# Patient Record
Sex: FEMALE | Race: WHITE | HISPANIC OR LATINO | Employment: FULL TIME | URBAN - METROPOLITAN AREA
[De-identification: names, ages, dates, MRNs, and addresses within clinical notes are randomized per-mention and may not be internally consistent; named-entity substitution may affect disease eponyms.]

---

## 2018-05-16 ENCOUNTER — NON-PROVIDER VISIT (OUTPATIENT)
Dept: OBGYN | Facility: CLINIC | Age: 23
End: 2018-05-16

## 2018-05-16 DIAGNOSIS — Z32.01 POSITIVE PREGNANCY TEST: ICD-10-CM

## 2018-05-16 LAB
INT CON NEG: NEGATIVE
INT CON POS: POSITIVE
POC URINE PREGNANCY TEST: POSITIVE

## 2018-05-16 PROCEDURE — 81025 URINE PREGNANCY TEST: CPT | Performed by: OBSTETRICS & GYNECOLOGY

## 2018-06-04 ENCOUNTER — INITIAL PRENATAL (OUTPATIENT)
Dept: OBGYN | Facility: CLINIC | Age: 23
End: 2018-06-04
Payer: MEDICAID

## 2018-06-04 VITALS
HEIGHT: 61 IN | BODY MASS INDEX: 35.87 KG/M2 | DIASTOLIC BLOOD PRESSURE: 96 MMHG | SYSTOLIC BLOOD PRESSURE: 138 MMHG | WEIGHT: 190 LBS

## 2018-06-04 DIAGNOSIS — O09.899 SUPERVISION OF OTHER HIGH RISK PREGNANCY, ANTEPARTUM: ICD-10-CM

## 2018-06-04 PROCEDURE — 99203 OFFICE O/P NEW LOW 30 MIN: CPT | Performed by: OBSTETRICS & GYNECOLOGY

## 2018-06-04 NOTE — PROGRESS NOTES
"Venus Jimenez,  22 y.o.  female presents today with a C/O of :oligomenorrhea. Pt   No LMP recorded (lmp unknown). Patient is pregnant.       Subjective : Nausea/Vomiting: No:  Abdominal /pelvic cramping : No :   vaginal bleeding:No      Menstrual Flow : moderate   GYN ROS:  no breast pain or new or enlarging lumps on self exam      History reviewed. No pertinent past medical history.    History reviewed. No pertinent surgical history.    Current Birth control:  none    OB History    Para Term  AB Living   1             SAB TAB Ectopic Molar Multiple Live Births                    # Outcome Date GA Lbr Raul/2nd Weight Sex Delivery Anes PTL Lv   1 Current                       Allergy:      Patient has no known allergies.    Exam;    /96   Ht 1.549 m (5' 1\")   Wt 86.2 kg (190 lb)   LMP  (LMP Unknown)   BMI 35.90 kg/m²   well-appearing, well-hydrated, well-nourished  normal;   PERRLA, EOMI, fundi grossly normal, no papilledema, no AV nicking, sclera clear  Clear to auscultation  RRR No M  abdomen is soft without significant tenderness, masses, organomegaly or guarding  External genitalia normal, Vagina normal without discharge, Urethra without abnormality or dischargeLab.    No results found for this or any previous visit (from the past 336 hour(s)).  Ultrasound : Per my Read     Transabdominal     Second/third trimester findings: BPD: 7.87 cm, Placenta localization: fundal posterior  Fetal presentation: cephalic and US RAJESH: 2018  BPD/HC/AC/FL  C/w 63l1nmc   Active FM, Cardiac       Assessment:    Late entry   32 week pregnancy       Plan:  2 weeks for NOB   Need Labs , ASAP, and schedule Level 2 scan     "

## 2018-06-04 NOTE — PROGRESS NOTES
today. unknown LMP. Irregular periods  On PNV  Phone # [phone]  Pharmacy verified with patient  + FM for 1 month  c/o vaginal itching, whitish, yellowish discharge .

## 2018-06-05 ENCOUNTER — HOSPITAL ENCOUNTER (OUTPATIENT)
Facility: MEDICAL CENTER | Age: 23
End: 2018-06-05
Attending: NURSE PRACTITIONER
Payer: COMMERCIAL

## 2018-06-05 ENCOUNTER — INITIAL PRENATAL (OUTPATIENT)
Dept: OBGYN | Facility: CLINIC | Age: 23
End: 2018-06-05

## 2018-06-05 ENCOUNTER — APPOINTMENT (OUTPATIENT)
Dept: OBGYN | Facility: CLINIC | Age: 23
End: 2018-06-05

## 2018-06-05 ENCOUNTER — ROUTINE PRENATAL (OUTPATIENT)
Dept: OBGYN | Facility: CLINIC | Age: 23
End: 2018-06-05

## 2018-06-05 VITALS
DIASTOLIC BLOOD PRESSURE: 82 MMHG | SYSTOLIC BLOOD PRESSURE: 140 MMHG | WEIGHT: 199 LBS | HEIGHT: 61 IN | BODY MASS INDEX: 37.57 KG/M2

## 2018-06-05 DIAGNOSIS — R03.0 ELEVATED BP WITHOUT DIAGNOSIS OF HYPERTENSION: Primary | ICD-10-CM

## 2018-06-05 DIAGNOSIS — R03.0 ELEVATED BP WITHOUT DIAGNOSIS OF HYPERTENSION: ICD-10-CM

## 2018-06-05 DIAGNOSIS — O09.899 SUPERVISION OF OTHER HIGH RISK PREGNANCY, ANTEPARTUM: ICD-10-CM

## 2018-06-05 LAB
APPEARANCE UR: CLEAR
BILIRUB UR STRIP-MCNC: NORMAL MG/DL
COLOR UR AUTO: YELLOW
GLUCOSE UR STRIP.AUTO-MCNC: NEGATIVE MG/DL
KETONES UR STRIP.AUTO-MCNC: NEGATIVE MG/DL
LEUKOCYTE ESTERASE UR QL STRIP.AUTO: NORMAL
NITRITE UR QL STRIP.AUTO: NEGATIVE
NST ACOUSTIC STIMULATION: NORMAL
NST ACTION NECESSARY: NORMAL
NST ASSESSMENT: NORMAL
NST BASELINE: 140
NST INDICATIONS: NORMAL
NST OTHER DATA: NORMAL
NST READ BY: NORMAL
NST RETURN: NORMAL
NST UTERINE ACTIVITY: NORMAL
PH UR STRIP.AUTO: 6 [PH] (ref 5–8)
PROT UR QL STRIP: NEGATIVE MG/DL
RBC UR QL AUTO: NEGATIVE
SP GR UR STRIP.AUTO: 1.02
UROBILINOGEN UR STRIP-MCNC: NORMAL MG/DL

## 2018-06-05 PROCEDURE — 90715 TDAP VACCINE 7 YRS/> IM: CPT | Performed by: NURSE PRACTITIONER

## 2018-06-05 PROCEDURE — 59401 PR NEW OB VISIT: CPT | Performed by: NURSE PRACTITIONER

## 2018-06-05 PROCEDURE — 59025 FETAL NON-STRESS TEST: CPT | Performed by: NURSE PRACTITIONER

## 2018-06-05 PROCEDURE — 81002 URINALYSIS NONAUTO W/O SCOPE: CPT | Performed by: NURSE PRACTITIONER

## 2018-06-05 PROCEDURE — 90471 IMMUNIZATION ADMIN: CPT | Performed by: NURSE PRACTITIONER

## 2018-06-05 NOTE — PROGRESS NOTES
NOB visit today   Ob f/u. + fetal movement good  No VB, LOF or contractions white yellowish discharge  With odor/burning/itching   C/O   Phone number # [phone]  Pharmacy verified with patient  FL=179 lbs     180 lbs before pregnancy      QY=055/82  FOB is involved  Baby was planned  Pt is not working nor lifting, around chemical  CRISTOBAL given today with instructions   BTL offered today.declines    Cystic fibrosis offered. Pt declines   Tdap vaccine given. 06/05/18 Right  Deltoid. VIS given and screening check list reviewed with pt. Verified byRomel Mccarty. MA

## 2018-06-05 NOTE — LETTER
"Count Your Baby's Movements  Another step to a healthy delivery    Venus Jimenez             Dept: [phone]    How Many Weeks Pregnant? 33w3d    Date to Begin Countin2018              How to use this chart    One way for your physician to keep track of your baby's health is by knowing how often the baby moves (or \"kicks\") in your womb.  You can help your physician to do this by using this chart every day.    Every day, you should see how many hours it takes for your baby to move 10 times.  Start in the morning, as soon as you get up.    · First, write down the time your baby moves until you get to 10.  · Check off one box every time your baby moves until you get to 10.  · Write down the time you finished counting in the last column.  · Total how long it took to count up all 10 movements.  · Finally, fill in the box that shows how long this took.  After counting 10 movements, you no longer have to count any more that day.  The next morning, just start counting again as soon as you get up.    What should you call a \"movement\"?  It is hard to say, because it will feel different from one mother to another and from one pregnancy to the next.  The important thing is that you count the movements the same way throughout your pregnancy.  If you have more questions, you should ask your physician.    Count carefully every day!  SAMPLE:  Week 28    How many hours did it take to feel 10 movements?       Start  Time     1     2     3     4     5     6     7     8     9     10   Finish Time   Mon 8:20 ·  ·  ·  ·  ·  ·  ·  ·  ·  ·  11:40                  Sat               Sun                 IMPORTANT: You should contact your physician if it takes more than two hours for you to feel 10 movements.  Each morning, write down the time and start to count the movements of your baby.  Keep track by checking off one box every time you feel one movement.  When you have " "felt 10 \"kicks\", write down the time you finished counting in the last column.  Then fill in the   box (over the check mansoor) for the number of hours it took.  Be sure to read the complete instructions on the previous page.            "

## 2018-06-05 NOTE — PROGRESS NOTES
"S:  Venus Jimenez is a 22 y.o.  who presents for her new OB exam.  She is 33w3d with and RAJESH of Estimated Date of Delivery: 18 by  done yesterday. She has complaints of abnormal yellow discharge with burning and odor.  She is currently not working outside the home. No heavy lifting or chemical exposure. No ER visits or previous care in this pregnancy. Has no support in this area. Family is [place]. FOB is here with her and works in [place]. Apparently she did not know she was pregnant.    Too late AFP.  declines CF.  Denies VB, LOF, or cramping.  Denies dysuria, vaginal DC. Reports positive fetal movement.     Pt is  and lives with FOB.  Pregnancy is desired.      Past Medical History:   Diagnosis Date   • Supervision of other high risk pregnancy, antepartum 2018     Family History   Problem Relation Age of Onset   • No Known Problems Mother    • No Known Problems Father    • No Known Problems Sister      Social History     Social History   • Marital status: Single     Spouse name: N/A   • Number of children: N/A   • Years of education: N/A     Occupational History   • Not on file.     Social History Main Topics   • Smoking status: Never Smoker   • Smokeless tobacco: Never Used   • Alcohol use No   • Drug use: No   • Sexual activity: Not Currently     Other Topics Concern   • Not on file     Social History Narrative   • No narrative on file     OB History    Para Term  AB Living   1             SAB TAB Ectopic Molar Multiple Live Births                    # Outcome Date GA Lbr Raul/2nd Weight Sex Delivery Anes PTL Lv   1 Current                   History of HSV I or II in self or partner: no  History of Thyroid problems: no    O:    Vitals:    18 0819 18 0822   BP:  140/82   Weight: 90.3 kg (199 lb) 90.3 kg (199 lb)   Height:  1.549 m (5' 1\")      See Prenatal Physical.    Wet mount: negative. Awaiting pap results.       A:   1.  IUP @ 33w3d per  late entry to " care.         2.  S=D        3.  See problem list below        4.  Abnormal discharge.          5.  Chronic vs gestational htn.      Patient Active Problem List    Diagnosis Date Noted   • Elevated BP  06/05/2018   • Supervision of other high risk pregnancy, antepartum 06/04/2018         P:  1.  GC/CT & pap done        2.  Prenatal labs ordered - lab slip given. Including labs for elevated BP        3.  Discussed PNV, diet, avoidances and adequate water intake        4.  NOB packet given        5.  Return to office in 1 wk        6.  Complete OB US first available.         7.  NST now for elevated BP.         8.  TDAP today. Given by medical assistant under supervision of physician in clinic today.     No orders of the defined types were placed in this encounter.

## 2018-06-05 NOTE — PROGRESS NOTES
"Subjective:      Venus Jimenez is a 22 y.o. female who presents with No chief complaint on file.            HPI    ROS       Objective:     /82   Ht 1.549 m (5' 1\")   Wt 90.3 kg (199 lb)   BMI 37.60 kg/m²      Physical Exam   Constitutional: She is oriented to person, place, and time. She appears well-developed and well-nourished.   HENT:   Head: Normocephalic.   Eyes: Pupils are equal, round, and reactive to light.   Neck: Normal range of motion. No thyromegaly present.   Cardiovascular: Normal rate, regular rhythm and normal heart sounds.    Pulmonary/Chest: Effort normal and breath sounds normal.   Abdominal: Soft. Bowel sounds are normal.   Genitourinary: Vagina normal and uterus normal.   Musculoskeletal: Normal range of motion.   Neurological: She is alert and oriented to person, place, and time.   Skin: Skin is warm and dry.   Psychiatric: She has a normal mood and affect. Her behavior is normal. Judgment and thought content normal.   Vitals reviewed.              Assessment/Plan:     1. Supervision of other high risk pregnancy, antepartum    - THINPREP RFLX HPV ASCUS W/CTNG; Future  - TDAP VACCINE =>8YO IM  - GLUCOSE 1HR GESTATIONAL; Future  - PREG CNTR PRENATAL PN; Future  - POCT Urinalysis  - CBC WITH DIFFERENTIAL    2. Elevated BP     - HEPATIC FUNCTION PANEL  - URIC ACID; Future  - URINETOTAL PROTEIN 24 HR; Future  - COMP METABOLIC PANEL      "

## 2018-06-06 LAB
C TRACH DNA GENITAL QL NAA+PROBE: NEGATIVE
CYTOLOGY REG CYTOL: NORMAL
N GONORRHOEA DNA GENITAL QL NAA+PROBE: NEGATIVE
SPECIMEN SOURCE: NORMAL

## 2018-06-07 ENCOUNTER — HOSPITAL ENCOUNTER (OUTPATIENT)
Dept: LAB | Facility: MEDICAL CENTER | Age: 23
End: 2018-06-07
Attending: NURSE PRACTITIONER
Payer: COMMERCIAL

## 2018-06-07 DIAGNOSIS — R03.0 ELEVATED BP WITHOUT DIAGNOSIS OF HYPERTENSION: ICD-10-CM

## 2018-06-07 DIAGNOSIS — O09.899 SUPERVISION OF OTHER HIGH RISK PREGNANCY, ANTEPARTUM: ICD-10-CM

## 2018-06-07 LAB
ABO GROUP BLD: NORMAL
ALBUMIN SERPL BCP-MCNC: 4.2 G/DL (ref 3.2–4.9)
ALBUMIN/GLOB SERPL: 1.2 G/DL
ALP SERPL-CCNC: 86 U/L (ref 30–99)
ALT SERPL-CCNC: 22 U/L (ref 2–50)
ANION GAP SERPL CALC-SCNC: 13 MMOL/L (ref 0–11.9)
APPEARANCE UR: ABNORMAL
AST SERPL-CCNC: 26 U/L (ref 12–45)
BACTERIA #/AREA URNS HPF: ABNORMAL /HPF
BASOPHILS # BLD AUTO: 0.7 % (ref 0–1.8)
BASOPHILS # BLD: 0.07 K/UL (ref 0–0.12)
BILIRUB CONJ SERPL-MCNC: 0.1 MG/DL (ref 0.1–0.5)
BILIRUB INDIRECT SERPL-MCNC: 0.3 MG/DL (ref 0–1)
BILIRUB SERPL-MCNC: 0.4 MG/DL (ref 0.1–1.5)
BILIRUB UR QL STRIP.AUTO: NEGATIVE
BLD GP AB SCN SERPL QL: NORMAL
BUN SERPL-MCNC: 9 MG/DL (ref 8–22)
CALCIUM SERPL-MCNC: 9.4 MG/DL (ref 8.5–10.5)
CHLORIDE SERPL-SCNC: 105 MMOL/L (ref 96–112)
CO2 SERPL-SCNC: 19 MMOL/L (ref 20–33)
COLOR UR: YELLOW
CREAT SERPL-MCNC: 0.37 MG/DL (ref 0.5–1.4)
EOSINOPHIL # BLD AUTO: 0.03 K/UL (ref 0–0.51)
EOSINOPHIL NFR BLD: 0.3 % (ref 0–6.9)
EPI CELLS #/AREA URNS HPF: ABNORMAL /HPF
ERYTHROCYTE [DISTWIDTH] IN BLOOD BY AUTOMATED COUNT: 32.8 FL (ref 35.9–50)
GLOBULIN SER CALC-MCNC: 3.5 G/DL (ref 1.9–3.5)
GLUCOSE 1H P 50 G GLC PO SERPL-MCNC: 98 MG/DL (ref 70–139)
GLUCOSE SERPL-MCNC: 75 MG/DL (ref 65–99)
GLUCOSE UR STRIP.AUTO-MCNC: NEGATIVE MG/DL
HBV SURFACE AG SER QL: NEGATIVE
HCT VFR BLD AUTO: 36.1 % (ref 37–47)
HGB BLD-MCNC: 11.2 G/DL (ref 12–16)
HIV 1+2 AB+HIV1 P24 AG SERPL QL IA: NON REACTIVE
IMM GRANULOCYTES # BLD AUTO: 0.07 K/UL (ref 0–0.11)
IMM GRANULOCYTES NFR BLD AUTO: 0.7 % (ref 0–0.9)
KETONES UR STRIP.AUTO-MCNC: >=160 MG/DL
LEUKOCYTE ESTERASE UR QL STRIP.AUTO: ABNORMAL
LYMPHOCYTES # BLD AUTO: 2.53 K/UL (ref 1–4.8)
LYMPHOCYTES NFR BLD: 23.5 % (ref 22–41)
MCH RBC QN AUTO: 19 PG (ref 27–33)
MCHC RBC AUTO-ENTMCNC: 31 G/DL (ref 33.6–35)
MCV RBC AUTO: 61.3 FL (ref 81.4–97.8)
MICRO URNS: ABNORMAL
MONOCYTES # BLD AUTO: 0.64 K/UL (ref 0–0.85)
MONOCYTES NFR BLD AUTO: 5.9 % (ref 0–13.4)
NEUTROPHILS # BLD AUTO: 7.42 K/UL (ref 2–7.15)
NEUTROPHILS NFR BLD: 68.9 % (ref 44–72)
NITRITE UR QL STRIP.AUTO: NEGATIVE
NRBC # BLD AUTO: 0 K/UL
NRBC BLD-RTO: 0 /100 WBC
PH UR STRIP.AUTO: 5.5 [PH]
PLATELET # BLD AUTO: 414 K/UL (ref 164–446)
PMV BLD AUTO: 11 FL (ref 9–12.9)
POTASSIUM SERPL-SCNC: 3.5 MMOL/L (ref 3.6–5.5)
PROT 24H UR-MCNC: 184.7 MG/24 HR (ref 30–150)
PROT 24H UR-MRATE: 8.3 MG/DL (ref 0–15)
PROT SERPL-MCNC: 7.7 G/DL (ref 6–8.2)
PROT UR QL STRIP: NEGATIVE MG/DL
RBC # BLD AUTO: 5.89 M/UL (ref 4.2–5.4)
RBC # URNS HPF: ABNORMAL /HPF
RBC UR QL AUTO: NEGATIVE
RH BLD: NORMAL
RUBV AB SER QL: 11.8 IU/ML
SODIUM SERPL-SCNC: 137 MMOL/L (ref 135–145)
SP GR UR STRIP.AUTO: 1.02
SPECIMEN VOL UR: 2225 ML
TREPONEMA PALLIDUM IGG+IGM AB [PRESENCE] IN SERUM OR PLASMA BY IMMUNOASSAY: NON REACTIVE
URATE SERPL-MCNC: 5.3 MG/DL (ref 1.9–8.2)
UROBILINOGEN UR STRIP.AUTO-MCNC: 1 MG/DL
WBC # BLD AUTO: 10.8 K/UL (ref 4.8–10.8)
WBC #/AREA URNS HPF: ABNORMAL /HPF

## 2018-06-09 LAB
BACTERIA UR CULT: NORMAL
SIGNIFICANT IND 70042: NORMAL
SITE SITE: NORMAL
SOURCE SOURCE: NORMAL

## 2018-06-12 ENCOUNTER — ROUTINE PRENATAL (OUTPATIENT)
Dept: OBGYN | Facility: CLINIC | Age: 23
End: 2018-06-12

## 2018-06-12 VITALS — DIASTOLIC BLOOD PRESSURE: 80 MMHG | SYSTOLIC BLOOD PRESSURE: 142 MMHG | WEIGHT: 189 LBS | BODY MASS INDEX: 35.71 KG/M2

## 2018-06-12 DIAGNOSIS — O09.899 SUPERVISION OF OTHER HIGH RISK PREGNANCY, ANTEPARTUM: ICD-10-CM

## 2018-06-12 PROCEDURE — 90040 PR PRENATAL FOLLOW UP: CPT | Performed by: NURSE PRACTITIONER

## 2018-06-12 NOTE — PROGRESS NOTES
OB f/u. + fetal movement.  No VB, LOF or UC's.  Good phone # [phone]  Preferred pharmacy confirmed  Pt c/o yellow discharge with itching and odor   Pt denies any HA, vision changes and swelling

## 2018-06-12 NOTE — PROGRESS NOTES
SUBJECTIVE:  Pt is a 22 y.o.   at 34w3d  gestation. Presents today for follow-up prenatal care. Reports no issues at this time.  Reports good fetal movement. Denies cramping/contractions, bleeding or leaking of fluid. Denies dysuria, headaches, N/V, or other issues at this time. Generally feels well today. Reports itchy on entrance to vagina and on labia for several months, yellowish/white d/c that smells like feet. Pt reports eating well, no N/V.     OBJECTIVE:  - See prenatal vitals flow  -   Vitals:    18 0843   BP: 146/84   Weight: 85.7 kg (189 lb)                 ASSESSMENT:   - IUP at 34w3d    - S=D   - Positive budding yeast, negative clue cells, neg trich  Patient Active Problem List    Diagnosis Date Noted   • Elevated BP  2018   • Supervision of other high risk pregnancy, antepartum 2018         PLAN:  - Monistat 7 advised   - US scheduled for   - Preeclampsia warning s/sx reviewed and where to go  - S/sx pregnancy and labor warning signs vs general discomforts discussed  - Fetal movements and kick counts reviewed   - Adequate hydration reinforced  - Nutrition/exercise/vitamin education; continued PNV  - Encouraged tour of LnD/childbirth education classes: contact info provided   - S/p TDAP vacc  - Anticipatory guidance given  - RTC in 1 weeks for follow-up prenatal care

## 2018-06-19 ENCOUNTER — APPOINTMENT (OUTPATIENT)
Dept: RADIOLOGY | Facility: IMAGING CENTER | Age: 23
End: 2018-06-19
Attending: NURSE PRACTITIONER
Payer: MEDICAID

## 2018-06-19 DIAGNOSIS — O09.899 SUPERVISION OF OTHER HIGH RISK PREGNANCY, ANTEPARTUM: ICD-10-CM

## 2018-06-19 PROCEDURE — 76805 OB US >/= 14 WKS SNGL FETUS: CPT | Performed by: OBSTETRICS & GYNECOLOGY

## 2018-06-21 ENCOUNTER — DATING (OUTPATIENT)
Dept: OBGYN | Facility: CLINIC | Age: 23
End: 2018-06-21

## 2018-06-21 DIAGNOSIS — O28.3 ABNORMAL FETAL ULTRASOUND: ICD-10-CM

## 2018-06-22 ENCOUNTER — ROUTINE PRENATAL (OUTPATIENT)
Dept: OBGYN | Facility: CLINIC | Age: 23
End: 2018-06-22
Payer: MEDICAID

## 2018-06-22 ENCOUNTER — HOSPITAL ENCOUNTER (OUTPATIENT)
Dept: LAB | Facility: MEDICAL CENTER | Age: 23
End: 2018-06-22
Attending: OBSTETRICS & GYNECOLOGY
Payer: MEDICAID

## 2018-06-22 VITALS — WEIGHT: 192 LBS | DIASTOLIC BLOOD PRESSURE: 92 MMHG | SYSTOLIC BLOOD PRESSURE: 144 MMHG | BODY MASS INDEX: 36.28 KG/M2

## 2018-06-22 DIAGNOSIS — O28.3 ABNORMAL FETAL ULTRASOUND: ICD-10-CM

## 2018-06-22 DIAGNOSIS — R03.0 ELEVATED BP WITHOUT DIAGNOSIS OF HYPERTENSION: ICD-10-CM

## 2018-06-22 DIAGNOSIS — O09.899 SUPERVISION OF OTHER HIGH RISK PREGNANCY, ANTEPARTUM: ICD-10-CM

## 2018-06-22 LAB
AST SERPL-CCNC: 19 U/L (ref 12–45)
BUN SERPL-MCNC: 7 MG/DL (ref 8–22)
CREAT SERPL-MCNC: 0.43 MG/DL (ref 0.5–1.4)
ERYTHROCYTE [DISTWIDTH] IN BLOOD BY AUTOMATED COUNT: 33.4 FL (ref 35.9–50)
HCT VFR BLD AUTO: 37.6 % (ref 37–47)
HGB BLD-MCNC: 11.4 G/DL (ref 12–16)
MCH RBC QN AUTO: 18.8 PG (ref 27–33)
MCHC RBC AUTO-ENTMCNC: 30.3 G/DL (ref 33.6–35)
MCV RBC AUTO: 61.8 FL (ref 81.4–97.8)
PLATELET # BLD AUTO: 379 K/UL (ref 164–446)
RBC # BLD AUTO: 6.08 M/UL (ref 4.2–5.4)
URATE SERPL-MCNC: 3.8 MG/DL (ref 1.9–8.2)
WBC # BLD AUTO: 10.7 K/UL (ref 4.8–10.8)

## 2018-06-22 PROCEDURE — 84450 TRANSFERASE (AST) (SGOT): CPT

## 2018-06-22 PROCEDURE — 82565 ASSAY OF CREATININE: CPT

## 2018-06-22 PROCEDURE — 84550 ASSAY OF BLOOD/URIC ACID: CPT

## 2018-06-22 PROCEDURE — 36415 COLL VENOUS BLD VENIPUNCTURE: CPT

## 2018-06-22 PROCEDURE — 90040 PR PRENATAL FOLLOW UP: CPT | Performed by: OBSTETRICS & GYNECOLOGY

## 2018-06-22 PROCEDURE — 84520 ASSAY OF UREA NITROGEN: CPT

## 2018-06-22 PROCEDURE — 85027 COMPLETE CBC AUTOMATED: CPT

## 2018-06-22 NOTE — PROGRESS NOTES
OB Follow Up--- High Risk  Fetal Cardiac Defect: ASD     Elevated BP       23 y.o. is a 34w6d presents in prenatal follow up.    Subjective:no complaints, concerned over U/S . Fetal Movement  positive    Problem List:has Supervision of other high risk pregnancy, antepartum; Elevated BP ; and Abnormal fetal ultrasound on her problem list.     Objective:   /92   Wt 87.1 kg (192 lb)   BMI 36.28 kg/m²   Repeat BP : 138/90    Abdomen:  S=D  Extremities:Normal        Lab:No results found for this or any previous visit (from the past 336 hour(s)).      Assessment:    34w6d      High Risk  Fetal Cardiac Defect: ASD     Elevated BP     Appears Gestational : Need repeat Labs   Early pregnancy warning signs (bleeding,pain,discharge,leaking) discussed.    Plan:  1 week  Referral to [facility]  Repeat labs   Continue water intake  Ambulate nightly after 37 weeks  Continue Kick counts

## 2018-06-24 ENCOUNTER — HOSPITAL ENCOUNTER (OUTPATIENT)
Facility: MEDICAL CENTER | Age: 23
End: 2018-06-24
Attending: OBSTETRICS & GYNECOLOGY
Payer: MEDICAID

## 2018-06-25 ENCOUNTER — HOSPITAL ENCOUNTER (OUTPATIENT)
Facility: MEDICAL CENTER | Age: 23
End: 2018-06-25
Attending: OBSTETRICS & GYNECOLOGY
Payer: MEDICAID

## 2018-06-25 PROCEDURE — 84156 ASSAY OF PROTEIN URINE: CPT

## 2018-06-25 PROCEDURE — 81050 URINALYSIS VOLUME MEASURE: CPT

## 2018-06-25 NOTE — PROGRESS NOTES
Referral faxed to Dr. HERNANDEZ  on 6/25/18.  They will contact patient to schedule appt.  Please check with patient if appt was made/ document. Thank you

## 2018-06-27 ENCOUNTER — TELEPHONE (OUTPATIENT)
Dept: OBGYN | Facility: CLINIC | Age: 23
End: 2018-06-27

## 2018-06-27 LAB
PROT 24H UR-MCNC: 156 MG/24 HR (ref 30–150)
PROT 24H UR-MRATE: 5.2 MG/DL (ref 0–15)
SPECIMEN VOL UR: 3000 ML

## 2018-06-27 NOTE — TELEPHONE ENCOUNTER
Pt called re: MFM referral, they haven't call her.  I called DR. Pichardo's office and spoke w/Rosy to check on referral status, they will call her today, per Rosy.  Pt notified and instructed to call tomorrow if they haven't call her today

## 2018-06-28 ENCOUNTER — TELEPHONE (OUTPATIENT)
Dept: OBGYN | Facility: CLINIC | Age: 23
End: 2018-06-28

## 2018-06-28 NOTE — TELEPHONE ENCOUNTER
Nicolette from Dr. Pichardo's called re:referral for ASD, states per Dr. Pichardo's he is no going to see pt due to GA (35 weeks +) because there is nothing that he can do.  She needs to be referral for  echo.

## 2018-06-29 ENCOUNTER — ROUTINE PRENATAL (OUTPATIENT)
Dept: OBGYN | Facility: CLINIC | Age: 23
End: 2018-06-29
Payer: MEDICAID

## 2018-06-29 VITALS — SYSTOLIC BLOOD PRESSURE: 146 MMHG | BODY MASS INDEX: 36.47 KG/M2 | DIASTOLIC BLOOD PRESSURE: 86 MMHG | WEIGHT: 193 LBS

## 2018-06-29 DIAGNOSIS — O09.899 SUPERVISION OF OTHER HIGH RISK PREGNANCY, ANTEPARTUM: Primary | ICD-10-CM

## 2018-06-29 DIAGNOSIS — Z3A.35 35 WEEKS GESTATION OF PREGNANCY: ICD-10-CM

## 2018-06-29 DIAGNOSIS — O28.3 ABNORMAL FETAL ULTRASOUND: ICD-10-CM

## 2018-06-29 PROCEDURE — 90040 PR PRENATAL FOLLOW UP: CPT | Performed by: NURSE PRACTITIONER

## 2018-06-29 NOTE — PROGRESS NOTES
Pt. here for Ob f/u and GBS today. Good # [phone]  Good FM  Pt states still using monistat 7 for yeast infection not sure if its ok to do GBS today.   Pharmacy verified.   Unable to see Dr. Pichardo due to GA, per Dr. Pichardo, needs to be referred to cardiology after delivery, per FYI.

## 2018-06-29 NOTE — PROGRESS NOTES
S) Pt is a 23 y.o.   at 35w6d  gestation. Routine prenatal care today. No complaints today. Is currently on Monistat for yeast, will do GBS next week. Labor precautions reviewed. US ordered to recheck heart, growth, RADHA, and s/d ratios due to CHTN. All questions answered.    Fetal movement Normal  Cramping no  VB no  LOF no   Denies dysuria. Generally feels well today. Good self-care activities identified. Denies headaches, swelling, visual changes, or epigastric pain .     O) Blood pressure 146/86, weight 87.5 kg (193 lb).        Labs:       PNL: WNL       GCT: 98       AFP: Not Examined       GBS: Next visit       Pertinent ultrasound -        18- Survey with increased placental thickness, EIF noted, and ASD with aneurysmal foramen ovale. Remainder WNL, RADHA 18.28cm, c/w prev dating. Refer to Dr Pichardo, but unable to see due to advanced gestational age. Will reorder limited US today     A) IUP at 35w6d       S=D         Patient Active Problem List    Diagnosis Date Noted   • Abnormal fetal ultrasound 2018   • Elevated BP  2018   • Supervision of other high risk pregnancy, antepartum 2018          SVE: deferred         TDAP: yes       FLU: no        BTL: no       : n/a       C/S Consent: n/a       IOL or C/S scheduled: no       LAST PAP: 18- Negative         P) s/s ptl vs general discomforts. Fetal movements reviewed. General ed and anticipatory guidance. Nutrition/exercise/vitamin. Plans breast Plans pp contraception- unsure  Continue PNV.

## 2018-07-06 ENCOUNTER — HOSPITAL ENCOUNTER (OUTPATIENT)
Dept: LAB | Facility: MEDICAL CENTER | Age: 23
End: 2018-07-06
Attending: OBSTETRICS & GYNECOLOGY
Payer: MEDICAID

## 2018-07-06 ENCOUNTER — HOSPITAL ENCOUNTER (OUTPATIENT)
Facility: MEDICAL CENTER | Age: 23
End: 2018-07-06
Attending: OBSTETRICS & GYNECOLOGY
Payer: MEDICAID

## 2018-07-06 ENCOUNTER — ROUTINE PRENATAL (OUTPATIENT)
Dept: OBGYN | Facility: CLINIC | Age: 23
End: 2018-07-06

## 2018-07-06 ENCOUNTER — ROUTINE PRENATAL (OUTPATIENT)
Dept: OBGYN | Facility: CLINIC | Age: 23
End: 2018-07-06
Payer: MEDICAID

## 2018-07-06 VITALS — DIASTOLIC BLOOD PRESSURE: 67 MMHG | WEIGHT: 197 LBS | SYSTOLIC BLOOD PRESSURE: 123 MMHG | BODY MASS INDEX: 37.22 KG/M2

## 2018-07-06 DIAGNOSIS — O09.899 SUPERVISION OF OTHER HIGH RISK PREGNANCY, ANTEPARTUM: ICD-10-CM

## 2018-07-06 DIAGNOSIS — O16.3 ELEVATED BLOOD PRESSURE AFFECTING PREGNANCY IN THIRD TRIMESTER, ANTEPARTUM: ICD-10-CM

## 2018-07-06 DIAGNOSIS — O13.3 PREGNANCY-INDUCED HYPERTENSION IN THIRD TRIMESTER: ICD-10-CM

## 2018-07-06 DIAGNOSIS — R03.0 ELEVATED BP WITHOUT DIAGNOSIS OF HYPERTENSION: ICD-10-CM

## 2018-07-06 DIAGNOSIS — O28.3 ABNORMAL FETAL ULTRASOUND: ICD-10-CM

## 2018-07-06 LAB
APPEARANCE UR: NORMAL
AST SERPL-CCNC: 17 U/L (ref 12–45)
BILIRUB UR STRIP-MCNC: NORMAL MG/DL
BUN SERPL-MCNC: 6 MG/DL (ref 8–22)
COLOR UR AUTO: NORMAL
CREAT SERPL-MCNC: 0.39 MG/DL (ref 0.5–1.4)
ERYTHROCYTE [DISTWIDTH] IN BLOOD BY AUTOMATED COUNT: 33.2 FL (ref 35.9–50)
GLUCOSE UR STRIP.AUTO-MCNC: NORMAL MG/DL
HCT VFR BLD AUTO: 35.7 % (ref 37–47)
HGB BLD-MCNC: 10.9 G/DL (ref 12–16)
KETONES UR STRIP.AUTO-MCNC: NORMAL MG/DL
LEUKOCYTE ESTERASE UR QL STRIP.AUTO: NORMAL
MCH RBC QN AUTO: 18.6 PG (ref 27–33)
MCHC RBC AUTO-ENTMCNC: 30.5 G/DL (ref 33.6–35)
MCV RBC AUTO: 60.9 FL (ref 81.4–97.8)
NITRITE UR QL STRIP.AUTO: NORMAL
NST ACOUSTIC STIMULATION: NORMAL
NST ACTION NECESSARY: NORMAL
NST ASSESSMENT: REACTIVE
NST BASELINE: 140
NST INDICATIONS: NORMAL
NST OTHER DATA: NORMAL
NST READ BY: NORMAL
NST RETURN: NORMAL
NST UTERINE ACTIVITY: NORMAL
PH UR STRIP.AUTO: 6.5 [PH] (ref 5–8)
PLATELET # BLD AUTO: 357 K/UL (ref 164–446)
PMV BLD AUTO: 11.3 FL (ref 9–12.9)
PROT UR QL STRIP: NORMAL MG/DL
RBC # BLD AUTO: 5.86 M/UL (ref 4.2–5.4)
RBC UR QL AUTO: NORMAL
SP GR UR STRIP.AUTO: 1.01
URATE SERPL-MCNC: 3.7 MG/DL (ref 1.9–8.2)
UROBILINOGEN UR STRIP-MCNC: NORMAL MG/DL
WBC # BLD AUTO: 10.8 K/UL (ref 4.8–10.8)

## 2018-07-06 PROCEDURE — 36415 COLL VENOUS BLD VENIPUNCTURE: CPT

## 2018-07-06 PROCEDURE — 84550 ASSAY OF BLOOD/URIC ACID: CPT

## 2018-07-06 PROCEDURE — 59025 FETAL NON-STRESS TEST: CPT | Performed by: OBSTETRICS & GYNECOLOGY

## 2018-07-06 PROCEDURE — 82565 ASSAY OF CREATININE: CPT

## 2018-07-06 PROCEDURE — 84450 TRANSFERASE (AST) (SGOT): CPT

## 2018-07-06 PROCEDURE — 87653 STREP B DNA AMP PROBE: CPT

## 2018-07-06 PROCEDURE — 85027 COMPLETE CBC AUTOMATED: CPT

## 2018-07-06 PROCEDURE — 90040 PR PRENATAL FOLLOW UP: CPT | Performed by: OBSTETRICS & GYNECOLOGY

## 2018-07-06 PROCEDURE — 84520 ASSAY OF UREA NITROGEN: CPT

## 2018-07-06 PROCEDURE — 81002 URINALYSIS NONAUTO W/O SCOPE: CPT | Performed by: OBSTETRICS & GYNECOLOGY

## 2018-07-06 NOTE — PROGRESS NOTES
Pt here today for OB follow up  Pt states no complaints   Reports +FM  Good # [phone]  Pharmacy Confirmed.

## 2018-07-06 NOTE — PROGRESS NOTES
NST  - reactive     BP - 144/87--> 137/72 --> 123/67    6/25/18=   24 hour TP - 156  PIH - wnl    Will repeat PIH labs  Preeclamptic precautions

## 2018-07-06 NOTE — PROGRESS NOTES
Patient is at 36w6d. Doing well. No headaches no visual changes no abdominal/RUQ/epigastric pain  Good FM, no contractions, no LOF< no VB  Patients' weight gain, fluid intake and exercise level discussed.Vitals, fundal height , fetal position, and FHR reviewed on flowsheet.    .../67   Wt 89.4 kg (197 lb)   BMI 37.22 kg/m²   Past Medical History:   Diagnosis Date   • Supervision of other high risk pregnancy, antepartum 2018     Patient Active Problem List    Diagnosis Date Noted   • Abnormal fetal ultrasound 2018   • Elevated BP  2018   • Supervision of other high risk pregnancy, antepartum 2018     Lab:  Recent Results (from the past 336 hour(s))   URINETOTAL PROTEIN 24 HR    Collection Time: 18  6:15 AM   Result Value Ref Range    Total Protein, Urine 5.2 0.0 - 15.0 mg/dL    Total Volume, Urine 3000 mL    Total Protein, 24 Hour Urine 156.0 (H) 30.0 - 150.0 mg/24 Hr   POCT Fetal Nonstress Test    Collection Time: 18  8:24 AM   Result Value Ref Range    NST Indications elevated BP     NST Baseline 140     NST Uterine Activity      NST Acoustic Stimulation vas     NST Assessment reactive     NST Action Necessary      NST Other Data      NST Return      NST Read By KANDIS      Negative protein - urine dip    Assessment: 23 year old   1  36w6d  2. Doing well  3. Size equals Dates and/or Scan  4. Weight gain: normal: Yes, excessive:No                   Plan:  1. BP monitoring done - 144/87 --> 123/67  2. Repeat PIH labs  3. Preeclamptic precautions  4. Gestational HTN -  IOL at 37-38 6/7 weeks

## 2018-07-08 PROBLEM — B95.1 GROUP BETA STREP POSITIVE: Status: ACTIVE | Noted: 2018-07-08

## 2018-07-08 LAB — GP B STREP DNA SPEC QL NAA+PROBE: POSITIVE

## 2018-07-09 ENCOUNTER — TELEPHONE (OUTPATIENT)
Dept: OBGYN | Facility: CLINIC | Age: 23
End: 2018-07-09

## 2018-07-09 NOTE — TELEPHONE ENCOUNTER
Pt notified to start iron supplements twice a day. Pt verbalized understanding and had no further questions. Next OB F/U on 7/12/18

## 2018-07-09 NOTE — TELEPHONE ENCOUNTER
----- Message from KRYS Sierra sent at 7/6/2018  2:19 PM PDT -----  Pt should start iron two times a day hour before two hours after food. Thanks!

## 2018-07-12 ENCOUNTER — ROUTINE PRENATAL (OUTPATIENT)
Dept: OBGYN | Facility: CLINIC | Age: 23
End: 2018-07-12
Payer: MEDICAID

## 2018-07-12 VITALS — SYSTOLIC BLOOD PRESSURE: 120 MMHG | BODY MASS INDEX: 36.84 KG/M2 | WEIGHT: 195 LBS | DIASTOLIC BLOOD PRESSURE: 68 MMHG

## 2018-07-12 DIAGNOSIS — O09.899 SUPERVISION OF OTHER HIGH RISK PREGNANCY, ANTEPARTUM: ICD-10-CM

## 2018-07-12 DIAGNOSIS — O28.3 ABNORMAL FETAL ULTRASOUND: ICD-10-CM

## 2018-07-12 DIAGNOSIS — B95.1 GROUP BETA STREP POSITIVE: ICD-10-CM

## 2018-07-12 PROCEDURE — 90040 PR PRENATAL FOLLOW UP: CPT | Performed by: PHYSICIAN ASSISTANT

## 2018-07-12 NOTE — PROGRESS NOTES
OB f/u. + fetal movement.  No VB, LOF or UC's.  Good phone # [phone]  GBS positive   Pt is scheduled for IOL on 07/20/2018 @ 8:00 am  Pt has been notified of time and date of IOL. - pt given information and instructions   Pt c/o white discharge, denies any itching or burning    Pt has follow-up US on 7-16-18 to check growth, RADHA and s/d ratio

## 2018-07-12 NOTE — PROGRESS NOTES
Pt has no complaints with cramping, UCs, Vb, LOF. +FM. Pt has IOL 7/20 - info given today. Pt has f/u Monday for US to recheck growth, RADHA, S;D ratio. Vtx position confirmed today by US. Cervix: Cl/th/floating. Labor precautions reviewed. IOL d/w pt in detail. RTC 1 wk or sooner prn.

## 2018-07-16 ENCOUNTER — DATING (OUTPATIENT)
Dept: OBGYN | Facility: CLINIC | Age: 23
End: 2018-07-16

## 2018-07-16 ENCOUNTER — APPOINTMENT (OUTPATIENT)
Dept: RADIOLOGY | Facility: IMAGING CENTER | Age: 23
End: 2018-07-16
Attending: NURSE PRACTITIONER
Payer: MEDICAID

## 2018-07-16 DIAGNOSIS — Z3A.35 35 WEEKS GESTATION OF PREGNANCY: ICD-10-CM

## 2018-07-16 PROCEDURE — 76816 OB US FOLLOW-UP PER FETUS: CPT | Mod: 26 | Performed by: OBSTETRICS & GYNECOLOGY

## 2018-07-19 ENCOUNTER — ROUTINE PRENATAL (OUTPATIENT)
Dept: OBGYN | Facility: CLINIC | Age: 23
End: 2018-07-19
Payer: MEDICAID

## 2018-07-19 VITALS — SYSTOLIC BLOOD PRESSURE: 128 MMHG | WEIGHT: 198 LBS | DIASTOLIC BLOOD PRESSURE: 80 MMHG | BODY MASS INDEX: 37.41 KG/M2

## 2018-07-19 DIAGNOSIS — O09.899 SUPERVISION OF OTHER HIGH RISK PREGNANCY, ANTEPARTUM: ICD-10-CM

## 2018-07-19 DIAGNOSIS — O28.3 ABNORMAL FETAL ULTRASOUND: ICD-10-CM

## 2018-07-19 DIAGNOSIS — B95.1 GROUP BETA STREP POSITIVE: ICD-10-CM

## 2018-07-19 PROCEDURE — 90040 PR PRENATAL FOLLOW UP: CPT | Performed by: NURSE PRACTITIONER

## 2018-07-19 NOTE — PROGRESS NOTES
Pt here today for OB follow up  Pt states no complaints  Reports +FM  Good # [phone]  Pharmacy Confirmed.  IOL 7/20 pt notified

## 2018-07-19 NOTE — PROGRESS NOTES
SUBJECTIVE:  Pt is a 23 y.o.   at 38w5d  gestation. Presents today for follow-up prenatal care. Reports no issues at this time.  Reports good fetal movement. Denies cramping/contractions, bleeding or leaking of fluid. Denies dysuria, headaches, N/V, or other issues at this time. Generally feels well today.     OBJECTIVE:  - See prenatal vitals flow  -   Vitals:    18 0921   BP: 128/80   Weight: 89.8 kg (198 lb)                 ASSESSMENT:   - IUP at 38w5d    - S=D   - Vertex by BSUS   Patient Active Problem List    Diagnosis Date Noted   • Group beta Strep positive 2018   • Abnormal fetal ultrasound 2018   • Elevated BP  2018   • Supervision of other high risk pregnancy, antepartum 2018         PLAN:  - S/sx pregnancy and labor warning signs vs general discomforts discussed  - Fetal movements and kick counts reviewed   - Adequate hydration reinforced  - Nutrition/exercise/vitamin education; continued PNV  - Plans for breastfeeding:handout given and reviewed   - Plans pills for contraception Pp: handout given and reviewed  - S/p TDAP vacc  - Anticipatory guidance given  - RTC PRN; IOL tomorrow

## 2018-07-20 ENCOUNTER — HOSPITAL ENCOUNTER (INPATIENT)
Facility: MEDICAL CENTER | Age: 23
LOS: 3 days | End: 2018-07-23
Attending: OBSTETRICS & GYNECOLOGY | Admitting: OBSTETRICS & GYNECOLOGY
Payer: MEDICAID

## 2018-07-20 DIAGNOSIS — O09.899 SUPERVISION OF OTHER HIGH RISK PREGNANCY, ANTEPARTUM: ICD-10-CM

## 2018-07-20 LAB
ALBUMIN SERPL BCP-MCNC: 3.9 G/DL (ref 3.2–4.9)
ALBUMIN/GLOB SERPL: 1.2 G/DL
ALP SERPL-CCNC: 102 U/L (ref 30–99)
ALT SERPL-CCNC: 16 U/L (ref 2–50)
ANION GAP SERPL CALC-SCNC: 10 MMOL/L (ref 0–11.9)
AST SERPL-CCNC: 19 U/L (ref 12–45)
BASOPHILS # BLD AUTO: 0.5 % (ref 0–1.8)
BASOPHILS # BLD: 0.04 K/UL (ref 0–0.12)
BILIRUB SERPL-MCNC: 0.5 MG/DL (ref 0.1–1.5)
BUN SERPL-MCNC: 7 MG/DL (ref 8–22)
CALCIUM SERPL-MCNC: 9.3 MG/DL (ref 8.5–10.5)
CHLORIDE SERPL-SCNC: 108 MMOL/L (ref 96–112)
CO2 SERPL-SCNC: 21 MMOL/L (ref 20–33)
CREAT SERPL-MCNC: 0.42 MG/DL (ref 0.5–1.4)
EOSINOPHIL # BLD AUTO: 0.04 K/UL (ref 0–0.51)
EOSINOPHIL NFR BLD: 0.5 % (ref 0–6.9)
ERYTHROCYTE [DISTWIDTH] IN BLOOD BY AUTOMATED COUNT: 34 FL (ref 35.9–50)
GLOBULIN SER CALC-MCNC: 3.3 G/DL (ref 1.9–3.5)
GLUCOSE SERPL-MCNC: 83 MG/DL (ref 65–99)
HCT VFR BLD AUTO: 36.8 % (ref 37–47)
HGB BLD-MCNC: 11.4 G/DL (ref 12–16)
HOLDING TUBE BB 8507: NORMAL
IMM GRANULOCYTES # BLD AUTO: 0.06 K/UL (ref 0–0.11)
IMM GRANULOCYTES NFR BLD AUTO: 0.7 % (ref 0–0.9)
LYMPHOCYTES # BLD AUTO: 2.19 K/UL (ref 1–4.8)
LYMPHOCYTES NFR BLD: 24.7 % (ref 22–41)
MCH RBC QN AUTO: 18.8 PG (ref 27–33)
MCHC RBC AUTO-ENTMCNC: 31 G/DL (ref 33.6–35)
MCV RBC AUTO: 60.7 FL (ref 81.4–97.8)
MONOCYTES # BLD AUTO: 0.58 K/UL (ref 0–0.85)
MONOCYTES NFR BLD AUTO: 6.5 % (ref 0–13.4)
NEUTROPHILS # BLD AUTO: 5.96 K/UL (ref 2–7.15)
NEUTROPHILS NFR BLD: 67.1 % (ref 44–72)
NRBC # BLD AUTO: 0 K/UL
NRBC BLD-RTO: 0 /100 WBC
PLATELET # BLD AUTO: 333 K/UL (ref 164–446)
PMV BLD AUTO: 10.5 FL (ref 9–12.9)
POTASSIUM SERPL-SCNC: 3.6 MMOL/L (ref 3.6–5.5)
PROT SERPL-MCNC: 7.2 G/DL (ref 6–8.2)
RBC # BLD AUTO: 6.06 M/UL (ref 4.2–5.4)
SODIUM SERPL-SCNC: 139 MMOL/L (ref 135–145)
URATE SERPL-MCNC: 4.6 MG/DL (ref 1.9–8.2)
WBC # BLD AUTO: 8.9 K/UL (ref 4.8–10.8)

## 2018-07-20 PROCEDURE — 700101 HCHG RX REV CODE 250: Performed by: NURSE PRACTITIONER

## 2018-07-20 PROCEDURE — 84550 ASSAY OF BLOOD/URIC ACID: CPT

## 2018-07-20 PROCEDURE — 700101 HCHG RX REV CODE 250: Performed by: STUDENT IN AN ORGANIZED HEALTH CARE EDUCATION/TRAINING PROGRAM

## 2018-07-20 PROCEDURE — 85025 COMPLETE CBC W/AUTO DIFF WBC: CPT

## 2018-07-20 PROCEDURE — 80053 COMPREHEN METABOLIC PANEL: CPT

## 2018-07-20 PROCEDURE — 770002 HCHG ROOM/CARE - OB PRIVATE (112)

## 2018-07-20 PROCEDURE — 3E0P7VZ INTRODUCTION OF HORMONE INTO FEMALE REPRODUCTIVE, VIA NATURAL OR ARTIFICIAL OPENING: ICD-10-PCS | Performed by: OBSTETRICS & GYNECOLOGY

## 2018-07-20 RX ORDER — ALUMINA, MAGNESIA, AND SIMETHICONE 2400; 2400; 240 MG/30ML; MG/30ML; MG/30ML
30 SUSPENSION ORAL EVERY 6 HOURS PRN
Status: DISCONTINUED | OUTPATIENT
Start: 2018-07-20 | End: 2018-07-22 | Stop reason: HOSPADM

## 2018-07-20 RX ORDER — CLINDAMYCIN PHOSPHATE 900 MG/50ML
900 INJECTION, SOLUTION INTRAVENOUS EVERY 8 HOURS
Status: DISCONTINUED | OUTPATIENT
Start: 2018-07-20 | End: 2018-07-22

## 2018-07-20 RX ORDER — SODIUM CHLORIDE, SODIUM LACTATE, POTASSIUM CHLORIDE, CALCIUM CHLORIDE 600; 310; 30; 20 MG/100ML; MG/100ML; MG/100ML; MG/100ML
INJECTION, SOLUTION INTRAVENOUS
Status: ACTIVE
Start: 2018-07-20 | End: 2018-07-20

## 2018-07-20 RX ORDER — MISOPROSTOL 200 UG/1
800 TABLET ORAL
Status: COMPLETED | OUTPATIENT
Start: 2018-07-20 | End: 2018-07-22

## 2018-07-20 RX ORDER — SODIUM CHLORIDE, SODIUM LACTATE, POTASSIUM CHLORIDE, CALCIUM CHLORIDE 600; 310; 30; 20 MG/100ML; MG/100ML; MG/100ML; MG/100ML
INJECTION, SOLUTION INTRAVENOUS CONTINUOUS
Status: ACTIVE | OUTPATIENT
Start: 2018-07-20 | End: 2018-07-20

## 2018-07-20 RX ADMIN — DINOPROSTONE 5 MG: 20 SUPPOSITORY VAGINAL at 11:33

## 2018-07-20 RX ADMIN — DINOPROSTONE 5 MG: 20 SUPPOSITORY VAGINAL at 20:39

## 2018-07-20 ASSESSMENT — LIFESTYLE VARIABLES
ALCOHOL_USE: NO
EVER_SMOKED: NEVER

## 2018-07-20 ASSESSMENT — COPD QUESTIONNAIRES
IN THE PAST 12 MONTHS DO YOU DO LESS THAN YOU USED TO BECAUSE OF YOUR BREATHING PROBLEMS: DISAGREE/UNSURE
HAVE YOU SMOKED AT LEAST 100 CIGARETTES IN YOUR ENTIRE LIFE: NO/DON'T KNOW
DO YOU EVER COUGH UP ANY MUCUS OR PHLEGM?: NO/ONLY WITH OCCASIONAL COLDS OR INFECTIONS
COPD SCREENING SCORE: 0
DURING THE PAST 4 WEEKS HOW MUCH DID YOU FEEL SHORT OF BREATH: NONE/LITTLE OF THE TIME

## 2018-07-20 ASSESSMENT — PATIENT HEALTH QUESTIONNAIRE - PHQ9
1. LITTLE INTEREST OR PLEASURE IN DOING THINGS: NOT AT ALL
2. FEELING DOWN, DEPRESSED, IRRITABLE, OR HOPELESS: NOT AT ALL
SUM OF ALL RESPONSES TO PHQ9 QUESTIONS 1 AND 2: 0
1. LITTLE INTEREST OR PLEASURE IN DOING THINGS: NOT AT ALL
SUM OF ALL RESPONSES TO PHQ9 QUESTIONS 1 AND 2: 0
2. FEELING DOWN, DEPRESSED, IRRITABLE, OR HOPELESS: NOT AT ALL

## 2018-07-20 NOTE — PROGRESS NOTES
0830 Pt arrived to L&D for scheduled IOL for HTN  Pt taken to S220, EFM and TOCO applied, POC discussed, orders received, admission profile completed, IV started, labs drawn and sent, VSS. Pt reprota positive fetal movement, denies leaking of vaginal fluid or blood and denies feeling contractions.  Pt encouraged to call RN for any needs, wants, desires or concerns.   0845 Dr. Rudd in department, updated on pt status. Will perform bs u/s to ensure vtx presentation. Will continue with POC.  1133 Dr. Rudd at bedside, SVE unchanged, pt denies feeling contractions. Prostaglandin gel placed, pt educaiton provided all questions answered. Pt allowed to eat and ambulate with intermittent monitoring after initial hour of gel placement, will continue with POC.  1805 Dr. Mitchell at bedside, SVE as noted. Pt still julia regularly, will allow pt to contract as is and once pt;s contraction pattern spaces out then NOC RN may place second gel. Will continue with POC.  1900 report given to NOC RN, assumed care, POC discussed, all questions answered, VSS.

## 2018-07-21 PROCEDURE — 700111 HCHG RX REV CODE 636 W/ 250 OVERRIDE (IP): Performed by: STUDENT IN AN ORGANIZED HEALTH CARE EDUCATION/TRAINING PROGRAM

## 2018-07-21 PROCEDURE — 770002 HCHG ROOM/CARE - OB PRIVATE (112)

## 2018-07-21 PROCEDURE — 10H07YZ INSERTION OF OTHER DEVICE INTO PRODUCTS OF CONCEPTION, VIA NATURAL OR ARTIFICIAL OPENING: ICD-10-PCS | Performed by: OBSTETRICS & GYNECOLOGY

## 2018-07-21 PROCEDURE — 700101 HCHG RX REV CODE 250: Performed by: NURSE PRACTITIONER

## 2018-07-21 PROCEDURE — 700105 HCHG RX REV CODE 258

## 2018-07-21 PROCEDURE — 700111 HCHG RX REV CODE 636 W/ 250 OVERRIDE (IP)

## 2018-07-21 PROCEDURE — 3E033VJ INTRODUCTION OF OTHER HORMONE INTO PERIPHERAL VEIN, PERCUTANEOUS APPROACH: ICD-10-PCS | Performed by: OBSTETRICS & GYNECOLOGY

## 2018-07-21 PROCEDURE — 700105 HCHG RX REV CODE 258: Performed by: OBSTETRICS & GYNECOLOGY

## 2018-07-21 RX ORDER — ROPIVACAINE HYDROCHLORIDE 2 MG/ML
INJECTION, SOLUTION EPIDURAL; INFILTRATION; PERINEURAL
Status: COMPLETED
Start: 2018-07-21 | End: 2018-07-21

## 2018-07-21 RX ORDER — BUPIVACAINE HYDROCHLORIDE 2.5 MG/ML
INJECTION, SOLUTION EPIDURAL; INFILTRATION; INTRACAUDAL
Status: ACTIVE
Start: 2018-07-21 | End: 2018-07-21

## 2018-07-21 RX ORDER — SODIUM CHLORIDE, SODIUM LACTATE, POTASSIUM CHLORIDE, CALCIUM CHLORIDE 600; 310; 30; 20 MG/100ML; MG/100ML; MG/100ML; MG/100ML
INJECTION, SOLUTION INTRAVENOUS
Status: COMPLETED
Start: 2018-07-21 | End: 2018-07-21

## 2018-07-21 RX ORDER — DEXTROSE, SODIUM CHLORIDE, SODIUM LACTATE, POTASSIUM CHLORIDE, AND CALCIUM CHLORIDE 5; .6; .31; .03; .02 G/100ML; G/100ML; G/100ML; G/100ML; G/100ML
INJECTION, SOLUTION INTRAVENOUS CONTINUOUS
Status: DISCONTINUED | OUTPATIENT
Start: 2018-07-21 | End: 2018-07-22

## 2018-07-21 RX ORDER — SODIUM CHLORIDE, SODIUM LACTATE, POTASSIUM CHLORIDE, CALCIUM CHLORIDE 600; 310; 30; 20 MG/100ML; MG/100ML; MG/100ML; MG/100ML
INJECTION, SOLUTION INTRAVENOUS
Status: COMPLETED
Start: 2018-07-21 | End: 2018-07-22

## 2018-07-21 RX ORDER — SODIUM CHLORIDE, SODIUM LACTATE, POTASSIUM CHLORIDE, CALCIUM CHLORIDE 600; 310; 30; 20 MG/100ML; MG/100ML; MG/100ML; MG/100ML
INJECTION, SOLUTION INTRAVENOUS
Status: ACTIVE
Start: 2018-07-21 | End: 2018-07-22

## 2018-07-21 RX ADMIN — CLINDAMYCIN IN 5 PERCENT DEXTROSE 900 MG: 18 INJECTION, SOLUTION INTRAVENOUS at 21:36

## 2018-07-21 RX ADMIN — SODIUM CHLORIDE, SODIUM LACTATE, POTASSIUM CHLORIDE, CALCIUM CHLORIDE AND DEXTROSE MONOHYDRATE: 5; 600; 310; 30; 20 INJECTION, SOLUTION INTRAVENOUS at 19:16

## 2018-07-21 RX ADMIN — CLINDAMYCIN IN 5 PERCENT DEXTROSE 900 MG: 18 INJECTION, SOLUTION INTRAVENOUS at 04:15

## 2018-07-21 RX ADMIN — ROPIVACAINE HYDROCHLORIDE 100 ML: 2 INJECTION, SOLUTION EPIDURAL; INFILTRATION at 09:24

## 2018-07-21 RX ADMIN — SODIUM CHLORIDE, POTASSIUM CHLORIDE, SODIUM LACTATE AND CALCIUM CHLORIDE 1000 ML: 600; 310; 30; 20 INJECTION, SOLUTION INTRAVENOUS at 09:14

## 2018-07-21 RX ADMIN — CLINDAMYCIN IN 5 PERCENT DEXTROSE 900 MG: 18 INJECTION, SOLUTION INTRAVENOUS at 12:30

## 2018-07-21 RX ADMIN — ROPIVACAINE HYDROCHLORIDE 100 ML: 2 INJECTION, SOLUTION EPIDURAL; INFILTRATION at 20:44

## 2018-07-21 RX ADMIN — SODIUM CHLORIDE, POTASSIUM CHLORIDE, SODIUM LACTATE AND CALCIUM CHLORIDE 1000 ML: 600; 310; 30; 20 INJECTION, SOLUTION INTRAVENOUS at 17:43

## 2018-07-21 RX ADMIN — Medication 2 MILLI-UNITS/MIN: at 04:20

## 2018-07-21 ASSESSMENT — PATIENT HEALTH QUESTIONNAIRE - PHQ9
1. LITTLE INTEREST OR PLEASURE IN DOING THINGS: NOT AT ALL
1. LITTLE INTEREST OR PLEASURE IN DOING THINGS: NOT AT ALL
2. FEELING DOWN, DEPRESSED, IRRITABLE, OR HOPELESS: NOT AT ALL
2. FEELING DOWN, DEPRESSED, IRRITABLE, OR HOPELESS: NOT AT ALL
SUM OF ALL RESPONSES TO PHQ9 QUESTIONS 1 AND 2: 0
SUM OF ALL RESPONSES TO PHQ9 QUESTIONS 1 AND 2: 0

## 2018-07-21 NOTE — PROGRESS NOTES
1900-Report received from HILDA Burt. Pt resting in bed with family at bedside, POC of discussed, pt verbalized understanding.   2035-JOHN Bauman at bedside to place gel and discuss POC.   0230-New orders from JOHN Bauman to start pitocin at 0330.   0330-Pt requesting to walk before starting pitocin, okay per JOHN Bauman.  0420-pitocin started  1328-NNEY-nr  0700-Report given to HILDA Burt

## 2018-07-21 NOTE — CARE PLAN
Problem: Pain  Goal: Alleviation of Pain or a reduction in pain to the patient's comfort goal  Outcome: PROGRESSING AS EXPECTED  Discussed pain management with pt. Discussed non-pharmaceutical interventions including position changes and relaxation techniques with pt. Pt will notify RN with any changes.     Problem: Risk for Infection, Impaired Wound Healing  Goal: Remain free from signs and symptoms of infection  Outcome: PROGRESSING AS EXPECTED  Pt afebrile. No S/S of infection at this time. Will continue to monitor.

## 2018-07-21 NOTE — PROGRESS NOTES
S) Patient is a G 1 P0 at 39 week gestation who presents for induction of labor secondary to htn. Has had 2 gel placements for cervical ripening.   O) cervix last check 1-2/50/-2 with updated check not doen. UC's currently none. , pos accels, no decels, moderate variability. /76, 133/89  A) iup at 39 week gestation      Category one strip  P) Patient is now ready for pitocin start up. Desires to walk prior to start. After patient has had a chance to perform adls and ambulate, start pitocin.

## 2018-07-21 NOTE — PROGRESS NOTES
0700 received report from NAGI Luciano RN, assumed care, POC discussed.  Pt resting in bed, breathing through contractions well, denies needs at this time, VSS. Pt encouraged to call RN for any needs, wants, desires or concerns.  0921 Dr. Sotelo at bedside to place epidural, ROMA Zheng RN at bedside with MD.  0932 epidural placed, pt tolerated well., VSS.  1000 jaramillo placed, pt tolerated well, denies needs, comfortable with epidural. VSS. Encouraged to call RN prn  1212 Dr. Rudd at bedside to place IUPC, SVE as noted, pt tolerated well. Education provided, pt resting comfortably with epidural.   1500 RN at bedside, SVE as noted.  1712 Dr. Ang at bedside, SVE as noted, FSE placed, pt education provided. Will continue with POC.  6911-1268, RN and MD at bedside replacing FSE, not able to make it work, reverted back to EFM. MD reviewed strip. Will continue with POC.   report given to NOC RN, assumed care, POC discussed, all questions answered, VSS. Pt was repositioned after epidural from right to left side and a peanut ball in place ever half hour with intermittent O2 via facemask, MD reviewed strip in department, aware of RN's interventions.    Dr. Ang in department, reviewed strip with RN, agree's with RN's assessment of FHR tracing(early/variable decels noted) and RN's interventions. CNM to perform SVE and continue with POC and anticipate . NOC RN aware.

## 2018-07-21 NOTE — PROGRESS NOTES
Cervix-fingertip/50% effaced/soft consistency/-3 station    Estimated fetal weight 3300 g    Continues to have contractions every 2-3 minutes with prostaglandin gel placed at 12 noon, unable to place a second gel    Will place a second prostaglandin gel when contractions dissipate    Blood pressure stable 120s over 70s

## 2018-07-21 NOTE — PROGRESS NOTES
[facility] LABOR AND DELIVERY PROGRESS NOTE    PATIENT ID:  NAME:  Venus Jimenez  MRN:               [id number]  YOB: 1995     23 y.o. female  at 39w0d being induced for PIH. Patient received epidural at 9:32 am.     Subjective: Feeling comfortable     negative  For CTXS.   negative Feels pain   positive for LOF  negative for vaginal bleeding.   positive for fetal movement    Objective:    Vitals:    18 1000 18 1015 18 1030 18 1045   BP: 114/54 115/58 111/61 126/72   Pulse: 77 72 95 69   Temp: 36.7 °C (98 °F)      TempSrc: Temporal      SpO2: 96%      Weight:       Height:           Cervix:  4-5cm/80%/-1  Wever: Uterine Contractions Q2-3 minutes.   FHRM: Baseline 130s, Accels to 140s, variable decels with quick return to baseline, moderate variability  Pitocin: 8  Pain control: epidural     Assessment: 23 y.o. female  at 39w0d. IUPC was placed.     Plan:   1. Continue current management  2. Anticipate  delivery

## 2018-07-22 LAB
BASOPHILS # BLD AUTO: 0.2 % (ref 0–1.8)
BASOPHILS # BLD: 0.06 K/UL (ref 0–0.12)
EOSINOPHIL # BLD AUTO: 0 K/UL (ref 0–0.51)
EOSINOPHIL NFR BLD: 0 % (ref 0–6.9)
ERYTHROCYTE [DISTWIDTH] IN BLOOD BY AUTOMATED COUNT: 34.4 FL (ref 35.9–50)
ERYTHROCYTE [DISTWIDTH] IN BLOOD BY AUTOMATED COUNT: 34.8 FL (ref 35.9–50)
HCT VFR BLD AUTO: 26.1 % (ref 37–47)
HCT VFR BLD AUTO: 30.4 % (ref 37–47)
HGB BLD-MCNC: 8.3 G/DL (ref 12–16)
HGB BLD-MCNC: 9.5 G/DL (ref 12–16)
IMM GRANULOCYTES # BLD AUTO: 0.25 K/UL (ref 0–0.11)
IMM GRANULOCYTES NFR BLD AUTO: 0.9 % (ref 0–0.9)
LYMPHOCYTES # BLD AUTO: 1.7 K/UL (ref 1–4.8)
LYMPHOCYTES NFR BLD: 6.3 % (ref 22–41)
MCH RBC QN AUTO: 19.1 PG (ref 27–33)
MCH RBC QN AUTO: 19.3 PG (ref 27–33)
MCHC RBC AUTO-ENTMCNC: 31.3 G/DL (ref 33.6–35)
MCHC RBC AUTO-ENTMCNC: 31.8 G/DL (ref 33.6–35)
MCV RBC AUTO: 60.6 FL (ref 81.4–97.8)
MCV RBC AUTO: 61 FL (ref 81.4–97.8)
MONOCYTES # BLD AUTO: 1.28 K/UL (ref 0–0.85)
MONOCYTES NFR BLD AUTO: 4.7 % (ref 0–13.4)
NEUTROPHILS # BLD AUTO: 23.89 K/UL (ref 2–7.15)
NEUTROPHILS NFR BLD: 87.9 % (ref 44–72)
NRBC # BLD AUTO: 0.02 K/UL
NRBC BLD-RTO: 0.1 /100 WBC
PLATELET # BLD AUTO: 290 K/UL (ref 164–446)
PLATELET # BLD AUTO: 302 K/UL (ref 164–446)
PMV BLD AUTO: 10.4 FL (ref 9–12.9)
PMV BLD AUTO: 10.7 FL (ref 9–12.9)
RBC # BLD AUTO: 4.31 M/UL (ref 4.2–5.4)
RBC # BLD AUTO: 4.98 M/UL (ref 4.2–5.4)
WBC # BLD AUTO: 24.7 K/UL (ref 4.8–10.8)
WBC # BLD AUTO: 27.2 K/UL (ref 4.8–10.8)

## 2018-07-22 PROCEDURE — 770002 HCHG ROOM/CARE - OB PRIVATE (112)

## 2018-07-22 PROCEDURE — A9270 NON-COVERED ITEM OR SERVICE: HCPCS | Performed by: NURSE PRACTITIONER

## 2018-07-22 PROCEDURE — 36415 COLL VENOUS BLD VENIPUNCTURE: CPT

## 2018-07-22 PROCEDURE — 0KQM0ZZ REPAIR PERINEUM MUSCLE, OPEN APPROACH: ICD-10-PCS | Performed by: OBSTETRICS & GYNECOLOGY

## 2018-07-22 PROCEDURE — 700111 HCHG RX REV CODE 636 W/ 250 OVERRIDE (IP): Performed by: STUDENT IN AN ORGANIZED HEALTH CARE EDUCATION/TRAINING PROGRAM

## 2018-07-22 PROCEDURE — 700101 HCHG RX REV CODE 250

## 2018-07-22 PROCEDURE — 85027 COMPLETE CBC AUTOMATED: CPT

## 2018-07-22 PROCEDURE — 304965 HCHG RECOVERY SERVICES

## 2018-07-22 PROCEDURE — 59409 OBSTETRICAL CARE: CPT

## 2018-07-22 PROCEDURE — 303615 HCHG EPIDURAL/SPINAL ANESTHESIA FOR LABOR

## 2018-07-22 PROCEDURE — 85025 COMPLETE CBC W/AUTO DIFF WBC: CPT

## 2018-07-22 PROCEDURE — 700102 HCHG RX REV CODE 250 W/ 637 OVERRIDE(OP): Performed by: NURSE PRACTITIONER

## 2018-07-22 PROCEDURE — 700105 HCHG RX REV CODE 258

## 2018-07-22 PROCEDURE — 700102 HCHG RX REV CODE 250 W/ 637 OVERRIDE(OP): Performed by: STUDENT IN AN ORGANIZED HEALTH CARE EDUCATION/TRAINING PROGRAM

## 2018-07-22 PROCEDURE — A9270 NON-COVERED ITEM OR SERVICE: HCPCS | Performed by: STUDENT IN AN ORGANIZED HEALTH CARE EDUCATION/TRAINING PROGRAM

## 2018-07-22 RX ORDER — LOPERAMIDE HYDROCHLORIDE 2 MG/1
2 CAPSULE ORAL 4 TIMES DAILY PRN
Status: DISCONTINUED | OUTPATIENT
Start: 2018-07-22 | End: 2018-07-23 | Stop reason: HOSPADM

## 2018-07-22 RX ORDER — ONDANSETRON 2 MG/ML
4 INJECTION INTRAMUSCULAR; INTRAVENOUS EVERY 6 HOURS PRN
Status: DISCONTINUED | OUTPATIENT
Start: 2018-07-22 | End: 2018-07-23 | Stop reason: HOSPADM

## 2018-07-22 RX ORDER — OXYCODONE AND ACETAMINOPHEN 10; 325 MG/1; MG/1
1 TABLET ORAL EVERY 4 HOURS PRN
Status: DISCONTINUED | OUTPATIENT
Start: 2018-07-22 | End: 2018-07-23 | Stop reason: HOSPADM

## 2018-07-22 RX ORDER — MISOPROSTOL 200 UG/1
800 TABLET ORAL PRN
Status: DISCONTINUED | OUTPATIENT
Start: 2018-07-22 | End: 2018-07-23 | Stop reason: HOSPADM

## 2018-07-22 RX ORDER — OXYCODONE HYDROCHLORIDE AND ACETAMINOPHEN 5; 325 MG/1; MG/1
1 TABLET ORAL EVERY 4 HOURS PRN
Status: DISCONTINUED | OUTPATIENT
Start: 2018-07-22 | End: 2018-07-23 | Stop reason: HOSPADM

## 2018-07-22 RX ORDER — ONDANSETRON 4 MG/1
4 TABLET, ORALLY DISINTEGRATING ORAL EVERY 6 HOURS PRN
Status: DISCONTINUED | OUTPATIENT
Start: 2018-07-22 | End: 2018-07-23 | Stop reason: HOSPADM

## 2018-07-22 RX ORDER — CARBOPROST TROMETHAMINE 250 UG/ML
250 INJECTION, SOLUTION INTRAMUSCULAR ONCE
Status: COMPLETED | OUTPATIENT
Start: 2018-07-22 | End: 2018-07-22

## 2018-07-22 RX ORDER — ACETAMINOPHEN 325 MG/1
650 TABLET ORAL EVERY 4 HOURS PRN
Status: DISCONTINUED | OUTPATIENT
Start: 2018-07-22 | End: 2018-07-23 | Stop reason: HOSPADM

## 2018-07-22 RX ORDER — ACETAMINOPHEN 325 MG/1
325 TABLET ORAL EVERY 4 HOURS PRN
Status: DISCONTINUED | OUTPATIENT
Start: 2018-07-22 | End: 2018-07-22

## 2018-07-22 RX ORDER — BISACODYL 10 MG
10 SUPPOSITORY, RECTAL RECTAL PRN
Status: DISCONTINUED | OUTPATIENT
Start: 2018-07-22 | End: 2018-07-23 | Stop reason: HOSPADM

## 2018-07-22 RX ORDER — CARBOPROST TROMETHAMINE 250 UG/ML
INJECTION, SOLUTION INTRAMUSCULAR
Status: COMPLETED
Start: 2018-07-22 | End: 2018-07-22

## 2018-07-22 RX ORDER — OXYTOCIN 10 [USP'U]/ML
10 INJECTION, SOLUTION INTRAMUSCULAR; INTRAVENOUS ONCE
Status: ACTIVE | OUTPATIENT
Start: 2018-07-22 | End: 2018-07-23

## 2018-07-22 RX ORDER — DOCUSATE SODIUM 100 MG/1
100 CAPSULE, LIQUID FILLED ORAL 2 TIMES DAILY PRN
Status: DISCONTINUED | OUTPATIENT
Start: 2018-07-22 | End: 2018-07-23 | Stop reason: HOSPADM

## 2018-07-22 RX ORDER — IBUPROFEN 800 MG/1
800 TABLET ORAL EVERY 8 HOURS PRN
Status: DISCONTINUED | OUTPATIENT
Start: 2018-07-22 | End: 2018-07-23 | Stop reason: HOSPADM

## 2018-07-22 RX ORDER — OXYCODONE HYDROCHLORIDE AND ACETAMINOPHEN 5; 325 MG/1; MG/1
2 TABLET ORAL EVERY 4 HOURS PRN
Status: DISCONTINUED | OUTPATIENT
Start: 2018-07-22 | End: 2018-07-23 | Stop reason: HOSPADM

## 2018-07-22 RX ORDER — FERROUS SULFATE 325(65) MG
325 TABLET ORAL 2 TIMES DAILY WITH MEALS
Status: DISCONTINUED | OUTPATIENT
Start: 2018-07-22 | End: 2018-07-23 | Stop reason: HOSPADM

## 2018-07-22 RX ORDER — VITAMIN A ACETATE, BETA CAROTENE, ASCORBIC ACID, CHOLECALCIFEROL, .ALPHA.-TOCOPHEROL ACETATE, DL-, THIAMINE MONONITRATE, RIBOFLAVIN, NIACINAMIDE, PYRIDOXINE HYDROCHLORIDE, FOLIC ACID, CYANOCOBALAMIN, CALCIUM CARBONATE, FERROUS FUMARATE, ZINC OXIDE, CUPRIC OXIDE 3080; 12; 120; 400; 1; 1.84; 3; 20; 22; 920; 25; 200; 27; 10; 2 [IU]/1; UG/1; MG/1; [IU]/1; MG/1; MG/1; MG/1; MG/1; MG/1; [IU]/1; MG/1; MG/1; MG/1; MG/1; MG/1
1 TABLET, FILM COATED ORAL EVERY MORNING
Status: DISCONTINUED | OUTPATIENT
Start: 2018-07-23 | End: 2018-07-23 | Stop reason: HOSPADM

## 2018-07-22 RX ADMIN — CARBOPROST TROMETHAMINE 250 MCG: 250 INJECTION, SOLUTION INTRAMUSCULAR at 02:23

## 2018-07-22 RX ADMIN — MISOPROSTOL 800 MCG: 200 TABLET ORAL at 01:15

## 2018-07-22 RX ADMIN — ONDANSETRON 4 MG: 2 INJECTION INTRAMUSCULAR; INTRAVENOUS at 02:57

## 2018-07-22 RX ADMIN — Medication 325 MG: at 17:19

## 2018-07-22 RX ADMIN — Medication 2000 ML/HR: at 00:05

## 2018-07-22 RX ADMIN — LOPERAMIDE HYDROCHLORIDE 2 MG: 2 CAPSULE ORAL at 11:13

## 2018-07-22 RX ADMIN — SODIUM CHLORIDE, POTASSIUM CHLORIDE, SODIUM LACTATE AND CALCIUM CHLORIDE 500 ML: 600; 310; 30; 20 INJECTION, SOLUTION INTRAVENOUS at 00:34

## 2018-07-22 RX ADMIN — Medication 125 ML/HR: at 01:17

## 2018-07-22 RX ADMIN — LOPERAMIDE HYDROCHLORIDE 2 MG: 2 CAPSULE ORAL at 04:15

## 2018-07-22 RX ADMIN — Medication 325 MG: at 08:29

## 2018-07-22 ASSESSMENT — PAIN SCALES - GENERAL
PAINLEVEL_OUTOF10: 3
PAINLEVEL_OUTOF10: 0
PAINLEVEL_OUTOF10: 3
PAINLEVEL_OUTOF10: 3
PAINLEVEL_OUTOF10: 0

## 2018-07-22 NOTE — PROGRESS NOTES
"Mother states baby BF twice so far, denies pain when BF, discussed normal course of BF the first 24-48-72 hours, discussed expected feeding frequency and duration, encouraged [id number]    Plan:  Encouraged to attempt to BF Q 2-3 hours, more often if feeding cues noted    Mother has [facility], educated on BF assistance available at [place] after discharge and encouaraged to seek ongoing assistance as needed.    \"A New Beginning\" booklet provided    Encouraged to call for assistance as needed    "

## 2018-07-22 NOTE — PROGRESS NOTES
Pt. Arrived by wheelchair  to postpartum  with belongings to room 312, received report from Carlton STEVENS. Pt. Doing well. Funds firm, light kirsty/rubra. Pt oriented to room, emergency call light and infant security. Pt. Aware of dangers of sleeping with infant. Call light within reach.

## 2018-07-22 NOTE — CARE PLAN
Problem: Alteration in comfort related to episiotomy, vaginal repair and/or after birth pains  Goal: Patient verbalizes acceptable pain level    Intervention: Assess effectiveness of pain meds within 1 hour of administration  Pt pain at a comfortable level, will continue to monitor and medicate per MAR      Problem: Potential knowledge deficit related to lack of understanding of self and  care  Goal: Patient will verbalize understanding of self and infant care    Intervention: Assess patient and knowledge of self and infant care  Pt able to care for self and infant.

## 2018-07-22 NOTE — PROGRESS NOTES
Name:   Venus Jimenez   Date/Time:  2018 6:38 AM  Gestational Age:  39w1d  Admit Date:   2018  Admitting Dx:   Pregnancy  IOL  Labor and delivery indication for care or intervention    POD# PPD #1 S/P      S:  Abdominal pain no   Ambulating   yes  Tolerating PO  yes  Flatus    yes  Bleeding   Yes, minimal, passed 1 clot   Voiding   yes   Dizziness   no  Breast feeding  yes  Breast tenderness  no    O:  Pulse: (!) 115  Blood Pressure: 142/77     Temp  Av.9 °C (98.5 °F)  Min: 36.4 °C (97.6 °F)  Max: 37.2 °C (99 °F)  Heart: regular rate and rhythm without gallops or murmurs  Lungs: clear bases  Abdomen: flat and soft/nontender / bowelsounds present   Extremities: non-tender      Recent Labs      18   0935  18   0225   WBC  8.9  27.2*   RBC  6.06*  4.98   HEMOGLOBIN  11.4*  9.5*   HEMATOCRIT  36.8*  30.4*   MCV  60.7*  61.0*   MCH  18.8*  19.1*   RDW  34.0*  34.4*   PLATELETCT  333  302   MPV  10.5  10.4   NEUTSPOLYS  67.10  87.90*   LYMPHOCYTES  24.70  6.30*   MONOCYTES  6.50  4.70   EOSINOPHILS  0.50  0.00   BASOPHILS  0.50  0.20           A:  PPD# 1 S/P   Stable/progressing well. Patient has to stay until day 2 because of GBS status.     P:  Routine  Postpartum care, 48 hours for GBS status, continue pain management, encourage ambulation, anticipate DC POD#2     Lucy Rudd M.D.

## 2018-07-22 NOTE — PROGRESS NOTES
"S: Pt comfortable with epidural.    O:Blood pressure 133/64, pulse 75, temperature 37.2 °C (99 °F), temperature source Temporal, height 1.549 m (5' 1\"), weight 89.8 kg (198 lb), SpO2 96 %.    IUPC -  q 3-4 min  EFM - 130s, moderate variability, + accels, mild decels  Cx - 6/80/-2    Pit at 15    A/P: 24 yo  @ 39+0/7 wks admitted for IOL due to PIH.  Fetal status reassuring.  Continue pitocin.  Anticipate .  "

## 2018-07-22 NOTE — CARE PLAN
Problem: Altered physiologic condition related to immediate post-delivery state and potential for bleeding/hemorrhage  Goal: Patient physiologically stable as evidenced by normal lochia, palpable uterine involution and vital signs within normal limits  Outcome: PROGRESSING AS EXPECTED  Fundus is firm, lochia is light and vital signs are stable. Will continue to monitor with Q6 hour checks and patient rounding    Problem: Potential for postpartum infection related to presence of episiotomy/vaginal tear and/or uterine contamination  Goal: Patient will be absent from signs and symptoms of infection  Outcome: PROGRESSING AS EXPECTED  Patient does not exhibit any signs/symptoms of infection. Will continue to monitor with Q6 hour checks and patient rounding

## 2018-07-22 NOTE — CARE PLAN
Problem: Pain  Goal: Alleviation of Pain or a reduction in pain to the patient's comfort goal  Outcome: PROGRESSING AS EXPECTED  Discussed pain management with pt. Discussed non-pharmaceutical interventions including position changes and relaxation techniques with pt. Pt will notify RN with any changes. Pt has epidural in place.     Problem: Risk for Infection, Impaired Wound Healing  Goal: Remain free from signs and symptoms of infection  Outcome: PROGRESSING AS EXPECTED  Pt afebrile. No S/S of infection at this time. Will continue to monitor. Last oral temp 98.1F

## 2018-07-22 NOTE — PROGRESS NOTES
0700 - Bedside report received from HILDA Escobar. Patient care assumed. Chart and orders reviewed  0829 - Pt assessment complete, wnl. Fundus firm with minimal discharge. Pt ambulating to bathroom and voiding without difficulty. Patient denies any dizziness or lightheadedness at this time. Patient denies any calf pain or tenderness at this time. Reviewed use of bathroom  emergency light. Plan of care discussed with patient for the day. Pain medication plan discussed with patient; patient states she will call if PRN pain medication is wanted. All questions/concerns addressed at this time. Call light within reach, encouraged to call with needs. Will continue with routine postpartum cares.

## 2018-07-22 NOTE — PROGRESS NOTES
-Report received from HILDA Burt. Pt sitting in throne position, POC discussed, pt verbalized understanding,  no needs at this time.   -Dr. Ang and CARMEN Crocker CNM, reviewed strip with RN, will continue with POC at this time. CNM to perform SVE and continue with POC and anticipate .  -CARMEN Crocker at bedside for SVE 8/-1  2200-SVE Lip/0  2345-SVE C/+3  2357- with second degree perineal lacerations that were repaired at bedside. Viable female per JOHN Garcia, apgars 8/9, infant skin to skin with mom.   0115-Pt has moderate bleeding, CARMEN Crocker updated, new order for Cytotec.  0215-Pt continues to have moderate bleeding, Hemabate ordered and given per CARMEN Crocker.  0235-Pt up to restroom and passed a large clot in toilet, L. Bronson called to room to evaluate. Pt now has firm fundus with light bleeding.   0315-Pt transferred to postpartum in stable condition with firm fundus and light lochia. Report given to HILDA Elkins

## 2018-07-22 NOTE — PROGRESS NOTES
PATIENT ID:  NAME:  Venus Jimenez  MRN:               [id number]  YOB: 1995    Subjective:   Patient is sitting up in throne feeling more pain on left side.     Objective:    Vitals:    18 1700 18 1800 18 1815 18 1900   BP: 131/60 126/77 134/77 126/67   Pulse: 88 76 71 90   Temp:  37.2 °C (98.9 °F)  36.7 °C (98.1 °F)   TempSrc:  Oral  Oral   SpO2:       Weight:       Height:           Cervix:  8cm/80%/-1, AROM  Hilger: Uterine Contractions Q 4 minutes.   FHRM: Baseline 155 , moderate variability, Accels none, early/variable decels   Pitocin: 15      Assessment:   23 y.o. female  at 39w0d.  GBS Positive   Vertex     Plan:   1. LR for IV hydration  2. Continued Pen G for GBS prophylaxis   3. Epidural in situ  4. Close monitoring of temp due to baseline change  5. Throne position  6. Continuous fetal heart rate and maternal monitoring  7. Anticipate

## 2018-07-22 NOTE — DELIVERY NOTE
Delivery Note    PATIENT ID:  NAME:  Venus Jimenez  MRN:               [id number]  YOB: 1995    Labor Course  Pt was admitted for IOL due to HTN with normal PIH labs.  Pt progressed well to complete and pushing with various induction methods .  Baby had various category two tracings throughout day with resolved late decels, some early decels and variables with consistent moderate variability with baby tolerating pushing well.     On 2018  at 23:57, this 23 y.o.,  39w1d now   , GBS positive female delivered via  under epidural anesthesia a viable female infant weight pending with APGAR scores of 8 and 9 at one and five minutes.   Baby to maternal abdomen.  Spontaneous cry.  Mouth and nares bulb suctioned by RN. Delayed cord clamping occurred with cord doubly clamped by myself and cut by FOB after pulsations had stopped.  Pitocin infusing in IVF.  Spontaneous delivery of placenta grossly intact @ 00:02.  CVx3. FF and bleeding small.  Upon vaginal exam, there was second degree perineal laceration which was repaired using 2.0 chromic in the usual sterile fashion. Pt and infant are stable and bonding.  Lap count: correct.  Estimated blood loss: 300.      JOHN Solis Dr., attending physician

## 2018-07-23 VITALS
HEART RATE: 86 BPM | BODY MASS INDEX: 37.38 KG/M2 | WEIGHT: 198 LBS | OXYGEN SATURATION: 99 % | RESPIRATION RATE: 18 BRPM | DIASTOLIC BLOOD PRESSURE: 65 MMHG | SYSTOLIC BLOOD PRESSURE: 102 MMHG | HEIGHT: 61 IN | TEMPERATURE: 97 F

## 2018-07-23 PROCEDURE — 700102 HCHG RX REV CODE 250 W/ 637 OVERRIDE(OP): Performed by: NURSE PRACTITIONER

## 2018-07-23 PROCEDURE — A9270 NON-COVERED ITEM OR SERVICE: HCPCS | Performed by: NURSE PRACTITIONER

## 2018-07-23 PROCEDURE — 700102 HCHG RX REV CODE 250 W/ 637 OVERRIDE(OP): Performed by: STUDENT IN AN ORGANIZED HEALTH CARE EDUCATION/TRAINING PROGRAM

## 2018-07-23 PROCEDURE — A9270 NON-COVERED ITEM OR SERVICE: HCPCS | Performed by: STUDENT IN AN ORGANIZED HEALTH CARE EDUCATION/TRAINING PROGRAM

## 2018-07-23 RX ADMIN — Medication 1 TABLET: at 06:03

## 2018-07-23 RX ADMIN — Medication 325 MG: at 08:04

## 2018-07-23 ASSESSMENT — PAIN SCALES - GENERAL: PAINLEVEL_OUTOF10: 0

## 2018-07-23 NOTE — CARE PLAN
Problem: Pain  Goal: Alleviation of Pain or a reduction in pain to the patient's comfort goal  Outcome: PROGRESSING AS EXPECTED  Pt reports no pain or discomfort at this time. Informed pt of prn pain medications. Pt declines.

## 2018-07-23 NOTE — PROGRESS NOTES
Mom states she is sore and would like more help with latch. Mom reports she has independently latched twice with good results.      Attempts latch but is very uncomfortable managing baby and bringing her to the breast. Demonstrated deep latch with football, cross cradle and football sitting, Mom most comfortable on the left side sitting football.  Baby latched deeply and sustained at the breast with swallows heard on the left side.     OP resources, peer counselor at [facility] and appointment on [facility] at [facility] [address].

## 2018-07-23 NOTE — PROGRESS NOTES
Post Partum Progress Note    Name:   Venus Jimenez   Date/Time:  7/23/2018 - 6:31 AM  Chief Admitting Dx:  Pregnancy  IOL  Labor and delivery indication for care or intervention  Delivery Type:  vaginal, spontaneous  Post-Op/Post Partum Days #:  1    Subjective:  Abdominal pain: no  Ambulating:   yes  Tolerating liquids:  yes  Tolerating food:  yes common adult  Flatus:   yes  BM:    no  Bleeding:   without any bleeding  Voiding:   yes  Dizziness:   no  Feeding:   breast    Vitals:    07/22/18 0340 07/22/18 0800 07/22/18 1154 07/22/18 2000   BP: 119/88 107/63 110/67 102/67   Pulse: (!) 117 87 82 85   Resp: 17 19 18 18   Temp: 37.1 °C (98.7 °F) 36.1 °C (97 °F) 36.2 °C (97.2 °F) 36.9 °C (98.5 °F)   TempSrc:       SpO2: 97% 98% 97% 98%   Weight:       Height:           Exam:  Breast: Tenderness no, Engorged no and Lactating yes  Abdomen: Abdomen soft, non-tender. BS normal. No masses,  No organomegaly  Fundal Tenderness:  no  Fundus Firm: yes  Incision: none  Below umbilicus: yes  Perineum: perineum intact second degree laceration  Lochia: mild  Extremities: Normal extremities, peripheral pulses and reflexes normal    Meds:  Current Facility-Administered Medications   Medication Dose   • ondansetron (ZOFRAN ODT) dispertab 4 mg  4 mg    Or   • ondansetron (ZOFRAN) syringe/vial injection 4 mg  4 mg   • oxytocin (PITOCIN) infusion (for postpartum)   mL/hr   • ibuprofen (MOTRIN) tablet 800 mg  800 mg   • oxyCODONE-acetaminophen (PERCOCET) 5-325 MG per tablet 1 Tab  1 Tab   • oxyCODONE-acetaminophen (PERCOCET-10)  MG per tablet 1 Tab  1 Tab   • oxytocin (PITOCIN) infusion (for postpartum)   mL/hr   • miSOPROStol (CYTOTEC) tablet 800 mcg  800 mcg   • ibuprofen (MOTRIN) tablet 800 mg  800 mg   • acetaminophen (TYLENOL) tablet 650 mg  650 mg   • oxyCODONE-acetaminophen (PERCOCET) 5-325 MG per tablet 2 Tab  2 Tab   • docusate sodium (COLACE) capsule 100 mg  100 mg   • prenatal plus vitamin (STUARTNATAL 1+1)  27-1 MG tablet 1 Tab  1 Tab   • acetaminophen (TYLENOL) tablet 650 mg  650 mg   • magnesium hydroxide (MILK OF MAGNESIA) suspension 30 mL  30 mL   • bisacodyl (DULCOLAX) suppository 10 mg  10 mg   • loperamide (IMODIUM) capsule 2 mg  2 mg   • ferrous sulfate tablet 325 mg  325 mg       Labs:   Recent Labs      07/20/18   0935  07/22/18   0225  07/22/18   1046   WBC  8.9  27.2*  24.7*   RBC  6.06*  4.98  4.31   HEMOGLOBIN  11.4*  9.5*  8.3*   HEMATOCRIT  36.8*  30.4*  26.1*   MCV  60.7*  61.0*  60.6*   MCH  18.8*  19.1*  19.3*   MCHC  31.0*  31.3*  31.8*   RDW  34.0*  34.4*  34.8*   PLATELETCT  333  302  290   MPV  10.5  10.4  10.7       Assessment:  Chief Admitting Dx:  Pregnancy  IOL  Labor and delivery indication for care or intervention  Delivery Type:  vaginal, spontaneous  Tubal Ligation:  no    Plan:  Continue routine post partum care.  GBS positive. Anticipate DC tomorrow.     ANTONI CalderonP.

## 2018-07-23 NOTE — DISCHARGE PLANNING
"Received order regarding \"social history\"    Called [facility] who states mother had late prenatal care.    Reviewed medical record this morning. Provider note indicated discharge tomorrow.    Infant and mother discharged before consult could be completed. Family has Medicaid and WIC. Infant has follow up appointment [facility] [place].     Will refer to clinic  to follow for any needs.     "

## 2018-07-23 NOTE — CARE PLAN
Problem: Safety  Goal: Will remain free from injury  Outcome: PROGRESSING AS EXPECTED  Pt awake and oriented. Baby in arms. Bed in lowest position. Call light in reach. Pt instructed to call for assistance as needed.

## 2018-07-23 NOTE — DISCHARGE SUMMARY
Discharge Summary:      Venus Jimenez      Admit Date:   2018  Discharge Date:  2018     Admitting diagnosis:  Pregnancy  IOL  Labor and delivery indication for care or intervention  Discharge Diagnosis: Status post vaginal, spontaneous.  Pregnancy Complications: group B strep (treated)  Tubal Ligation:  no        History:  Past Medical History:   Diagnosis Date   • Hypertension    • Supervision of other high risk pregnancy, antepartum 2018     OB History    Para Term  AB Living   1             SAB TAB Ectopic Molar Multiple Live Births                    # Outcome Date GA Lbr Raul/2nd Weight Sex Delivery Anes PTL Lv   1 Current                    Penicillins  Patient Active Problem List    Diagnosis Date Noted   • Group beta Strep positive 2018   • Abnormal fetal ultrasound 2018   • Elevated BP  2018   • Supervision of other high risk pregnancy, antepartum 2018        Hospital Course:   23 y.o. , now para 1, was admitted with the above mentioned diagnosis, underwent Induction of Labor, vaginal, spontaneous. Patient postpartum course was unremarkable, with progressive advancement in diet , ambulation and toleration of oral analgesia. Patient without complaints today and desires discharge.      Vitals:    18 0800 18 1154 18 2000 18 0800   BP: 107/63 110/67 102/67 102/65   Pulse: 87 82 85 86   Resp:    Temp: 36.1 °C (97 °F) 36.2 °C (97.2 °F) 36.9 °C (98.5 °F) 36.1 °C (97 °F)   TempSrc:       SpO2: 98% 97% 98% 99%   Weight:       Height:           Current Facility-Administered Medications   Medication Dose   • ondansetron (ZOFRAN ODT) dispertab 4 mg  4 mg    Or   • ondansetron (ZOFRAN) syringe/vial injection 4 mg  4 mg   • oxytocin (PITOCIN) infusion (for postpartum)   mL/hr   • ibuprofen (MOTRIN) tablet 800 mg  800 mg   • oxyCODONE-acetaminophen (PERCOCET) 5-325 MG per tablet 1 Tab  1 Tab   • oxyCODONE-acetaminophen  (PERCOCET-10)  MG per tablet 1 Tab  1 Tab   • oxytocin (PITOCIN) infusion (for postpartum)   mL/hr   • miSOPROStol (CYTOTEC) tablet 800 mcg  800 mcg   • ibuprofen (MOTRIN) tablet 800 mg  800 mg   • acetaminophen (TYLENOL) tablet 650 mg  650 mg   • oxyCODONE-acetaminophen (PERCOCET) 5-325 MG per tablet 2 Tab  2 Tab   • docusate sodium (COLACE) capsule 100 mg  100 mg   • prenatal plus vitamin (STUARTNATAL 1+1) 27-1 MG tablet 1 Tab  1 Tab   • acetaminophen (TYLENOL) tablet 650 mg  650 mg   • magnesium hydroxide (MILK OF MAGNESIA) suspension 30 mL  30 mL   • bisacodyl (DULCOLAX) suppository 10 mg  10 mg   • loperamide (IMODIUM) capsule 2 mg  2 mg   • ferrous sulfate tablet 325 mg  325 mg       Exam:  Breast Exam: negative  Abdomen: Abdomen soft, non-tender. BS normal. No masses,  No organomegaly  Fundus Non Tender: yes  Incision: none  Perineum: perineum intact  Extremity: extremities, peripheral pulses and reflexes normal     Labs:  Recent Labs      07/22/18   0225  07/22/18   1046   WBC  27.2*  24.7*   RBC  4.98  4.31   HEMOGLOBIN  9.5*  8.3*   HEMATOCRIT  30.4*  26.1*   MCV  61.0*  60.6*   MCH  19.1*  19.3*   MCHC  31.3*  31.8*   RDW  34.4*  34.8*   PLATELETCT  302  290   MPV  10.4  10.7        Activity:   Discharge to home  Pelvic Rest x 6 weeks    Assessment:  normal postpartum course  Discharge Assessment: Taking adequate diet and fluids     Follow up: .C or [facility] in 5 weeks for vaginal ; 1 week for incision check.      Discharge Meds:   No current outpatient prescriptions on file.       Bryce Delaney M.D.

## 2018-07-23 NOTE — DISCHARGE INSTRUCTIONS
POSTPARTUM DISCHARGE INSTRUCTIONS FOR MOM    YOB: 1995   Age: 23 y.o.               Admit Date: 2018     Discharge Date: 2018  Attending Doctor:  Jagdeep Mitchell M.D.                  Allergies:  Penicillins    Discharged to home by car. Discharged via wheelchair, hospital escort: Yes.  Special equipment needed: Not Applicable  Belongings with: Personal  Be sure to schedule a follow-up appointment with your primary care doctor or any specialists as instructed.     Discharge Plan:   Diet Plan: Discussed  Activity Level: Discussed  Confirmed Follow up Appointment: Patient to Call and Schedule Appointment  Confirmed Symptoms Management: Discussed  Medication Reconciliation Updated: Yes  Influenza Vaccine Indication: Indicated: Not available from distributor/    REASONS TO CALL YOUR OBSTETRICIAN:  1.   Persistent fever or shaking chills (Temperature higher than 100.4)  2.   Heavy bleeding (soaking more than 1 pad per hour); Passing clots  3.   Foul odor from vagina  4.   Mastitis (Breast infection; breast pain, chills, fever, redness)  5.   Urinary pain, burning or frequency  6.   Episiotomy infection  7.   Abdominal incision infection  8.   Severe depression longer than 24 hours    HAND WASHING  · Prior to handling the baby.  · Before breastfeeding or bottle feeding baby.  · After using the bathroom or changing the baby's diaper.    WOUND CARE  Ask your physician for additional care instructions.  In general:    ·  Incision:      · Keep clean and dry.    · Do NOT lift anything heavier than your baby for up to 6 weeks.    · There should not be any opening or pus.      VAGINAL CARE  · Nothing inside vagina for 6 weeks: no sexual intercourse, tampons or douching.  · Bleeding may continue for 2-4 weeks.  Amount may vary.    · Call your physician for heavy bleeding which means soaking more than 1 pad per hour    BIRTH CONTROL  · It is possible to become pregnant at any time after  "delivery and while breastfeeding.  · Plan to discuss a method of birth control with your physician at your follow up visit. visit.    DIET AND ELIMINATION  · Eating more fiber (bran cereal, fruits, and vegetables) and drinking plenty of fluids will help to avoid constipation.  · Urinary frequency after childbirth is normal.    POSTPARTUM BLUES  During the first few days after birth, you may experience a sense of the \"blues\" which may include impatience, irritability or even crying.  These feeling come and go quickly.  However, as many as 1 in 10 women experience emotional symptoms known as postpartum depression.    Postpartum depression:  May start as early as the second or third day after delivery or take several weeks or months to develop.  Symptoms of \"blues\" are present, but are more intense:  Crying spells; loss of appetite; feelings of hopelessness or loss of control; fear of touching the baby; over concern or no concern at all about the baby; little or no concern about your own appearance/caring for yourself; and/or inability to sleep or excessive sleeping.  Contact your physician if you are experiencing any of these symptoms.    Crisis Hotline:  · [place] Crisis Hotline:  0-215-PXTJXWE  Or 1-524.317.9896  · [place] Crisis Hotline:  1-499.822.8028  Or 843-569-0844    PREVENTING SHAKEN BABY:  If you are angry or stressed, PUT THE BABY IN THE CRIB, step away, take some deep breaths, and wait until you are calm to care for the baby.  DO NOT SHAKE THE BABY.  You are not alone, call a supporter for help.    · Crisis Call Center 24/7 crisis line 432-219-1672 or 1-123.546.1187  · You can also text them, text \"ANSWER\" to 156553    QUIT SMOKING/TOBACCO USE:  I understand the use of any tobacco products increases my chance of suffering from future heart disease and could cause other illnesses which may shorten my life. Quitting the use of tobacco products is the single most important thing I can do to improve my " health. For further information on smoking / tobacco cessation call a Toll Free Quit Line at 1-220.179.4026 (*[employer] [place]) or 1-435.958.9635 (American Lung Association) or you can access the web based program at www.lungusa.org.    · [place] Tobacco Users Help Line:  (765) 665-3765       Toll Free: 1-482.225.9194  · Quit Tobacco Program [facility] (085)890-2265    DEPRESSION / SUICIDE RISK:  As you are discharged from this [facility], it is important to learn how to keep safe from harming yourself.    Recognize the warning signs:  · Abrupt changes in personality, positive or negative- including increase in energy   · Giving away possessions  · Change in eating patterns- significant weight changes-  positive or negative  · Change in sleeping patterns- unable to sleep or sleeping all the time   · Unwillingness or inability to communicate  · Depression  · Unusual sadness, discouragement and loneliness  · Talk of wanting to die  · Neglect of personal appearance   · Rebelliousness- reckless behavior  · Withdrawal from people/activities they love  · Confusion- inability to concentrate     If you or a loved one observes any of these behaviors or has concerns about self-harm, here's what you can do:  · Talk about it- your feelings and reasons for harming yourself  · Remove any means that you might use to hurt yourself (examples: pills, rope, extension cords, firearm)  · Get professional help from the community (Mental Health, Substance Abuse, psychological counseling)  · Do not be alone:Call your Safe Contact- someone whom you trust who will be there for you.  · Call your local CRISIS HOTLINE 661-2243 or 371-422-4747  · Call your local Children's Mobile Crisis Response Team [place] (778) 063-6918 or www.Postini  · Call the toll free National Suicide Prevention Hotlines   · National Suicide Prevention Lifeline 328-894-GCZN (1635)  · National Hope Line Network  800-SUICIDE (928-7781)    DISCHARGE SURVEY:  Thank you for choosing [facility].  We hope we provided you with very good care.  You may be receiving a survey in the mail.  Please fill it out.  Your opinion is valuable to us.    ADDITIONAL EDUCATIONAL MATERIALS GIVEN TO PATIENT:        My signature on this form indicates that:  1.  I have reviewed and understand the above information  2.  My questions regarding this information have been answered to my satisfaction.  3.  I have formulated a plan with my discharge nurse to obtain my prescribed medication for home.

## 2018-09-04 ENCOUNTER — POST PARTUM (OUTPATIENT)
Dept: OBGYN | Facility: CLINIC | Age: 23
End: 2018-09-04
Payer: MEDICAID

## 2018-09-04 VITALS — DIASTOLIC BLOOD PRESSURE: 72 MMHG | BODY MASS INDEX: 34.58 KG/M2 | WEIGHT: 183 LBS | SYSTOLIC BLOOD PRESSURE: 118 MMHG

## 2018-09-04 DIAGNOSIS — O09.899 SUPERVISION OF OTHER HIGH RISK PREGNANCY, ANTEPARTUM: ICD-10-CM

## 2018-09-04 DIAGNOSIS — O28.3 ABNORMAL FETAL ULTRASOUND: ICD-10-CM

## 2018-09-04 PROCEDURE — 90050 PR POSTPARTUM VISIT: CPT | Performed by: NURSE PRACTITIONER

## 2018-09-04 RX ORDER — ACETAMINOPHEN AND CODEINE PHOSPHATE 120; 12 MG/5ML; MG/5ML
1 SOLUTION ORAL DAILY
Qty: 28 TAB | Refills: 12 | Status: SHIPPED | OUTPATIENT
Start: 2018-09-04 | End: 2019-09-10

## 2018-09-04 ASSESSMENT — ENCOUNTER SYMPTOMS
NEUROLOGICAL NEGATIVE: 1
EYES NEGATIVE: 1
CARDIOVASCULAR NEGATIVE: 1
CONSTIPATION: 1
PSYCHIATRIC NEGATIVE: 1
CONSTITUTIONAL NEGATIVE: 1
RESPIRATORY NEGATIVE: 1
MUSCULOSKELETAL NEGATIVE: 1

## 2018-09-04 NOTE — PROGRESS NOTES
Pt here today for postpartum exam.  Delivery Date 7/21/18  Currently: breast feeding   BCM: pt would like bc pill, [facility].   Good ph [phone]  Pt states having a hard time with BM and bleeding.   Chaperone offered and delined

## 2018-09-04 NOTE — PROGRESS NOTES
Subjective:      Venus Jimenez is a 23 y.o. female who presents with No chief complaint on file.            S   24 y/o now  s/p  on  of baby girl weighing 6lb 3 oz without complications. Second degree laceration. Now 6 weeks postpartum. Prenatal course significant for elevated blood pressures and abnormal US. Postpartum course without any complications. Feeling well and happy with baby, denies any severe mood swings or s/sx of postpartum depression and anxiety.      Baby is doing well,  breastfeeding exclusively.  No issues with breastfeeding at this time.  Has not resumed sexual activity.  Mother reports no issues with bowel or bladder routine, continued regular diet.Reports harder stool with some bleeding but going once a day.  Bleeding since birth has subsided at this time with no return to menses. No vaginal pain/odor/itching, fever, headaches, dizziness/SOB or dysuria. Desires pills for contraception.     O  See PE: Physical exam today with no abnormal findings  Vital signs WNL: BP and weight   H/H: 24.7>8.3/26.1<290  PAP: NILM on 2018    A  Reassuring exam of lactating postpartum woman s/p  on 18    P  - Rx pills for contraception//quick start back up method and missed pills reviewed   - Resumption of sexual activity: safe sex precautions given  - Counseling on nutrition, adequate hydration, and exercise   - Natural remedies and medications for constipation reviewed   - Counseling on PAP guidelines re: next PAP due 2021  - Warning s/sx of postpartum infection, depression, preeclampsia   - RTC PRN             Review of Systems   Constitutional: Negative.    HENT: Negative.    Eyes: Negative.    Respiratory: Negative.    Cardiovascular: Negative.    Gastrointestinal: Positive for constipation.   Genitourinary: Negative.    Musculoskeletal: Negative.    Skin: Negative.    Neurological: Negative.    Endo/Heme/Allergies: Negative.    Psychiatric/Behavioral: Negative.           Objective:      /72   Wt 83 kg (183 lb)   BMI 34.58 kg/m²      Physical Exam   Constitutional: She is oriented to person, place, and time. She appears well-developed and well-nourished.   HENT:   Head: Normocephalic and atraumatic.   Eyes: Pupils are equal, round, and reactive to light.   Neck: Normal range of motion. Neck supple. No thyromegaly present.   Cardiovascular: Normal rate and regular rhythm.    Pulmonary/Chest: Effort normal and breath sounds normal. Right breast exhibits no inverted nipple, no mass, no nipple discharge, no skin change and no tenderness. Left breast exhibits no inverted nipple, no mass, no nipple discharge, no skin change and no tenderness. Breasts are symmetrical. There is no breast swelling.   Abdominal: Soft.   Genitourinary: Vagina normal and uterus normal. No breast tenderness, discharge or bleeding. No labial fusion. There is no rash, tenderness, lesion or injury on the right labia. There is no rash, tenderness, lesion or injury on the left labia.   Musculoskeletal: Normal range of motion.   Neurological: She is alert and oriented to person, place, and time.   Skin: Skin is warm and dry.   Psychiatric: She has a normal mood and affect. Her behavior is normal. Judgment and thought content normal.               Assessment/Plan:

## 2019-09-10 ENCOUNTER — HOSPITAL ENCOUNTER (EMERGENCY)
Facility: MEDICAL CENTER | Age: 24
End: 2019-09-10
Attending: EMERGENCY MEDICINE

## 2019-09-10 VITALS
SYSTOLIC BLOOD PRESSURE: 114 MMHG | TEMPERATURE: 97.7 F | OXYGEN SATURATION: 99 % | HEART RATE: 79 BPM | BODY MASS INDEX: 33.17 KG/M2 | HEIGHT: 61 IN | WEIGHT: 175.71 LBS | DIASTOLIC BLOOD PRESSURE: 56 MMHG | RESPIRATION RATE: 19 BRPM

## 2019-09-10 DIAGNOSIS — R10.9 ABDOMINAL PAIN, UNSPECIFIED ABDOMINAL LOCATION: ICD-10-CM

## 2019-09-10 DIAGNOSIS — E87.6 HYPOKALEMIA: ICD-10-CM

## 2019-09-10 LAB
ALBUMIN SERPL BCP-MCNC: 5.1 G/DL (ref 3.2–4.9)
ALBUMIN/GLOB SERPL: 1.3 G/DL
ALP SERPL-CCNC: 66 U/L (ref 30–99)
ALT SERPL-CCNC: 31 U/L (ref 2–50)
ANION GAP SERPL CALC-SCNC: 14 MMOL/L (ref 0–11.9)
APPEARANCE UR: ABNORMAL
AST SERPL-CCNC: 30 U/L (ref 12–45)
BACTERIA #/AREA URNS HPF: ABNORMAL /HPF
BASOPHILS # BLD AUTO: 0.7 % (ref 0–1.8)
BASOPHILS # BLD: 0.06 K/UL (ref 0–0.12)
BILIRUB SERPL-MCNC: 0.4 MG/DL (ref 0.1–1.5)
BILIRUB UR QL STRIP.AUTO: NEGATIVE
BUN SERPL-MCNC: 8 MG/DL (ref 8–22)
CALCIUM SERPL-MCNC: 9.5 MG/DL (ref 8.4–10.2)
CHLORIDE SERPL-SCNC: 103 MMOL/L (ref 96–112)
CO2 SERPL-SCNC: 25 MMOL/L (ref 20–33)
COLOR UR: YELLOW
CREAT SERPL-MCNC: 0.42 MG/DL (ref 0.5–1.4)
EOSINOPHIL # BLD AUTO: 0.11 K/UL (ref 0–0.51)
EOSINOPHIL NFR BLD: 1.2 % (ref 0–6.9)
EPI CELLS #/AREA URNS HPF: ABNORMAL /HPF
ERYTHROCYTE [DISTWIDTH] IN BLOOD BY AUTOMATED COUNT: 33.4 FL (ref 35.9–50)
GLOBULIN SER CALC-MCNC: 3.8 G/DL (ref 1.9–3.5)
GLUCOSE SERPL-MCNC: 94 MG/DL (ref 65–99)
GLUCOSE UR STRIP.AUTO-MCNC: NEGATIVE MG/DL
HCG SERPL QL: NEGATIVE
HCT VFR BLD AUTO: 41.5 % (ref 37–47)
HGB BLD-MCNC: 12.7 G/DL (ref 12–16)
IMM GRANULOCYTES # BLD AUTO: 0.03 K/UL (ref 0–0.11)
IMM GRANULOCYTES NFR BLD AUTO: 0.3 % (ref 0–0.9)
KETONES UR STRIP.AUTO-MCNC: 15 MG/DL
LEUKOCYTE ESTERASE UR QL STRIP.AUTO: NEGATIVE
LIPASE SERPL-CCNC: 30 U/L (ref 7–58)
LYMPHOCYTES # BLD AUTO: 4.18 K/UL (ref 1–4.8)
LYMPHOCYTES NFR BLD: 45.7 % (ref 22–41)
MCH RBC QN AUTO: 18.6 PG (ref 27–33)
MCHC RBC AUTO-ENTMCNC: 30.6 G/DL (ref 33.6–35)
MCV RBC AUTO: 60.9 FL (ref 81.4–97.8)
MICRO URNS: ABNORMAL
MONOCYTES # BLD AUTO: 0.65 K/UL (ref 0–0.85)
MONOCYTES NFR BLD AUTO: 7.1 % (ref 0–13.4)
MUCOUS THREADS #/AREA URNS HPF: ABNORMAL /HPF
NEUTROPHILS # BLD AUTO: 4.12 K/UL (ref 2–7.15)
NEUTROPHILS NFR BLD: 45 % (ref 44–72)
NITRITE UR QL STRIP.AUTO: NEGATIVE
NRBC # BLD AUTO: 0 K/UL
NRBC BLD-RTO: 0 /100 WBC
PH UR STRIP.AUTO: 5.5 [PH] (ref 5–8)
PLATELET # BLD AUTO: 447 K/UL (ref 164–446)
PMV BLD AUTO: 10.2 FL (ref 9–12.9)
POTASSIUM SERPL-SCNC: 3.1 MMOL/L (ref 3.6–5.5)
PROT SERPL-MCNC: 8.9 G/DL (ref 6–8.2)
PROT UR QL STRIP: NEGATIVE MG/DL
RBC # BLD AUTO: 6.82 M/UL (ref 4.2–5.4)
RBC # URNS HPF: ABNORMAL /HPF
RBC UR QL AUTO: ABNORMAL
SODIUM SERPL-SCNC: 142 MMOL/L (ref 135–145)
SP GR UR STRIP.AUTO: 1.02
WBC # BLD AUTO: 9.2 K/UL (ref 4.8–10.8)
WBC #/AREA URNS HPF: ABNORMAL /HPF

## 2019-09-10 PROCEDURE — 80053 COMPREHEN METABOLIC PANEL: CPT

## 2019-09-10 PROCEDURE — 84703 CHORIONIC GONADOTROPIN ASSAY: CPT

## 2019-09-10 PROCEDURE — 81001 URINALYSIS AUTO W/SCOPE: CPT

## 2019-09-10 PROCEDURE — 700111 HCHG RX REV CODE 636 W/ 250 OVERRIDE (IP): Performed by: EMERGENCY MEDICINE

## 2019-09-10 PROCEDURE — 96374 THER/PROPH/DIAG INJ IV PUSH: CPT

## 2019-09-10 PROCEDURE — 99285 EMERGENCY DEPT VISIT HI MDM: CPT

## 2019-09-10 PROCEDURE — 85025 COMPLETE CBC W/AUTO DIFF WBC: CPT

## 2019-09-10 PROCEDURE — 700105 HCHG RX REV CODE 258: Performed by: EMERGENCY MEDICINE

## 2019-09-10 PROCEDURE — 83690 ASSAY OF LIPASE: CPT

## 2019-09-10 RX ORDER — SODIUM CHLORIDE 9 MG/ML
1000 INJECTION, SOLUTION INTRAVENOUS ONCE
Status: COMPLETED | OUTPATIENT
Start: 2019-09-10 | End: 2019-09-10

## 2019-09-10 RX ORDER — ONDANSETRON 2 MG/ML
4 INJECTION INTRAMUSCULAR; INTRAVENOUS ONCE
Status: COMPLETED | OUTPATIENT
Start: 2019-09-10 | End: 2019-09-10

## 2019-09-10 RX ORDER — ONDANSETRON 4 MG/1
4 TABLET, ORALLY DISINTEGRATING ORAL EVERY 6 HOURS PRN
Qty: 10 TAB | Refills: 0 | Status: SHIPPED | OUTPATIENT
Start: 2019-09-10

## 2019-09-10 RX ADMIN — SODIUM CHLORIDE 1000 ML: 9 INJECTION, SOLUTION INTRAVENOUS at 22:36

## 2019-09-10 RX ADMIN — ONDANSETRON 4 MG: 2 INJECTION INTRAMUSCULAR; INTRAVENOUS at 22:36

## 2019-09-10 ASSESSMENT — PAIN SCALES - WONG BAKER: WONGBAKER_NUMERICALRESPONSE: HURTS EVEN MORE

## 2019-09-11 NOTE — ED TRIAGE NOTES
lwr abd pain with diarrhea since yesterday am. Denies urinary sx.  No one else at home sick. Denies bloody stools.

## 2019-09-11 NOTE — ED NOTES
Pt complains of lower abdominal with diarrhea. Pt states pain feels like cramping and then she has to go to the bathroom for BM. pt denies nausea or vomiting. Denies dysuria.

## 2019-09-11 NOTE — ED PROVIDER NOTES
"ED Provider Note    ER Provider Note         CHIEF COMPLAINT  Chief Complaint   Patient presents with   • Abdominal Pain       HPI  Venus Jimenez is a 24 y.o. female who presents to the Emergency Department presents with left lower abdominal pain.  She says is been crampy.  She has been having some diarrhea that is nonbloody.  She denies any recent travel.  She denies any chest pains or shortness of breath.  She denies any urinary symptoms.  She denies any vaginal discharge or bleeding.  Patient says that the pain is mild.  She says it came after eating not well cooked chicken nuggets.  She denies any vomiting but has felt mildly nauseous.    REVIEW OF SYSTEMS  See HPI for further details. All other systems are negative.     PAST MEDICAL HISTORY       SURGICAL HISTORY  patient denies any surgical history    SOCIAL HISTORY  Social History     Tobacco Use   • Smoking status: Never Smoker   • Smokeless tobacco: Never Used   Substance Use Topics   • Alcohol use: No   • Drug use: No      Social History     Substance and Sexual Activity   Drug Use No       FAMILY HISTORY  Family History   Problem Relation Age of Onset   • No Known Problems Mother    • No Known Problems Father    • No Known Problems Sister        CURRENT MEDICATIONS  Home Medications     Reviewed by Erick Jones R.N. (Registered Nurse) on 09/10/19 at 2109  Med List Status: Not Addressed   Medication Last Dose Status        Patient Dash Taking any Medications                       ALLERGIES  Allergies   Allergen Reactions   • Penicillins Unspecified     Pt reports having reaction to PCN in childhood       PHYSICAL EXAM  VITAL SIGNS: /56   Pulse 79   Temp 36.5 °C (97.7 °F) (Temporal)   Resp 19   Ht 1.549 m (5' 1\")   Wt 79.7 kg (175 lb 11.3 oz)   SpO2 99%   BMI 33.20 kg/m²      Constitutional: Alert in no apparent distress.  HENT: No signs of trauma, Bilateral external ears normal, Nose normal.   Eyes: Pupils are equal and reactive, " Conjunctiva normal, Non-icteric.   Neck: Normal range of motion, No tenderness, Supple, No stridor.   Lymphatic: No lymphadenopathy noted.   Cardiovascular: Regular rate and rhythm, no murmurs.   Thorax & Lungs: Normal breath sounds, No respiratory distress, No wheezing, No chest tenderness.   Abdomen: Bowel sounds normal, Soft, no tenderness in the right lower quadrant.  Mild suprapubic tenderness.  Skin: Warm, Dry, No erythema, No rash.   Back: No bony tenderness, No CVA tenderness.   Extremities: Intact distal pulses, No edema, No tenderness, No cyanosis.  Musculoskeletal: Good range of motion in all major joints. No tenderness to palpation or major deformities noted.   Neurologic: Alert , Normal motor function, Normal sensory function, No focal deficits noted.   Psychiatric: Affect normal, Judgment normal, Mood normal.     DIAGNOSTIC STUDIES / PROCEDURES           LABS  Labs Reviewed   CBC WITH DIFFERENTIAL - Abnormal; Notable for the following components:       Result Value    RBC 6.82 (*)     MCV 60.9 (*)     MCH 18.6 (*)     MCHC 30.6 (*)     RDW 33.4 (*)     Platelet Count 447 (*)     Lymphocytes 45.70 (*)     All other components within normal limits   COMP METABOLIC PANEL - Abnormal; Notable for the following components:    Potassium 3.1 (*)     Anion Gap 14.0 (*)     Creatinine 0.42 (*)     Albumin 5.1 (*)     Total Protein 8.9 (*)     Globulin 3.8 (*)     All other components within normal limits   URINALYSIS - Abnormal; Notable for the following components:    Character Hazy (*)     Ketones 15 (*)     Occult Blood Moderate (*)     All other components within normal limits   URINE MICROSCOPIC (W/UA) - Abnormal; Notable for the following components:    RBC 2-5 (*)     Bacteria Few (*)     Epithelial Cells Moderate (*)     All other components within normal limits   LIPASE   HCG QUAL SERUM   ESTIMATED GFR       All labs reviewed by me.        COURSE & MEDICAL DECISION MAKING  Pertinent Labs & Imaging  studies reviewed. (See chart for details)    This is a 24 y.o. female that presents with abdominal pain.  I do not believe this is appendicitis.  This could represent urinary tract infection given the pain over the suprapubic area.  There is no left or right lower quadrant pain to suggest ovarian pathology.  I will check her pregnancy as well.  This is less likely to be pancreatitis.  We will check abdominal labs as well as a pregnancy test and then reassess.    10:20 PM - Patient seen and examined at bedside.    Patient was found to have no leukocytosis.  She was mildly hypokalemic 3.1.  She has no obvious infection in the urine.  Lipase is negative.  hCG is normal.  At this time she is feeling much improved after Zofran.  She was given IV fluids she was initially very dry appearing and I was unsure if she was could have a surgical abdomen.  After the IV fluid she feels much improved.  I will discharge her home with strict return precautions and follow-up.        FINAL IMPRESSION  1. Abdominal pain, unspecified abdominal location    2. Hypokalemia              Electronically signed by: Brain Varghese, 9/10/2019

## 2019-09-11 NOTE — ED NOTES
Dc instructions and medications discussed with patient at bedside. All questions answered at this time. IV removed. VSS. Pt ambulated to lobby with steady gait.

## 2019-10-12 ENCOUNTER — OFFICE VISIT (OUTPATIENT)
Dept: URGENT CARE | Facility: PHYSICIAN GROUP | Age: 24
End: 2019-10-12

## 2019-10-12 VITALS
HEART RATE: 105 BPM | OXYGEN SATURATION: 100 % | RESPIRATION RATE: 18 BRPM | SYSTOLIC BLOOD PRESSURE: 122 MMHG | HEIGHT: 61 IN | DIASTOLIC BLOOD PRESSURE: 84 MMHG | TEMPERATURE: 97.2 F | BODY MASS INDEX: 33.99 KG/M2 | WEIGHT: 180 LBS

## 2019-10-12 DIAGNOSIS — B00.2 RECURRENT ORAL HERPES SIMPLEX: ICD-10-CM

## 2019-10-12 DIAGNOSIS — B37.89 CANDIDA RASH OF GROIN: ICD-10-CM

## 2019-10-12 PROCEDURE — 99204 OFFICE O/P NEW MOD 45 MIN: CPT | Performed by: NURSE PRACTITIONER

## 2019-10-12 RX ORDER — NYSTATIN 100000 U/G
CREAM TOPICAL
Qty: 1 TUBE | Refills: 1 | Status: SHIPPED | OUTPATIENT
Start: 2019-10-12

## 2019-10-12 RX ORDER — ACYCLOVIR 400 MG/1
400 TABLET ORAL 3 TIMES DAILY
Qty: 30 TAB | Refills: 1 | Status: SHIPPED | OUTPATIENT
Start: 2019-10-12

## 2019-10-12 NOTE — PROGRESS NOTES
Chief Complaint   Patient presents with   • Rash     Rash on mouth and Leg, x1 month, pt states it comes and goes        HISTORY OF PRESENT ILLNESS: Patient is a 24 y.o. female who presents to urgent care today with complaints of a rash to both her face and groin.  Patient notes history of recurring rash to her mouth.  This occurs approximately 3 times a year, has occurred over several years time.  This time the rash has presented to her left upper lip.  Rash is mildly tender, tends to blister and drain.  She has never been seen for such.  She has tried Abreva for symptom relief.  Furthermore, over the past month she has noticed a rash to her left groin.  The rash is mildly itchy.  The rash is nontender.  No known contacts with similar rash.  She has not tried anything for symptom relief.    Patient Active Problem List    Diagnosis Date Noted   • Postpartum care following vaginal delivery 2018   • Elevated BP  2018       Allergies:Penicillins    Current Outpatient Medications Ordered in Epic   Medication Sig Dispense Refill   • nystatin (MYCOSTATIN) 739404 UNIT/GM Cream topical cream Apply to area twice per day. 1 Tube 1   • acyclovir (ZOVIRAX) 400 MG tablet Take 1 Tab by mouth 3 times a day. Take three times per day until symptoms healed, up to 10 days. 30 Tab 1   • ondansetron (ZOFRAN ODT) 4 MG TABLET DISPERSIBLE Take 1 Tab by mouth every 6 hours as needed for Nausea. (Patient not taking: Reported on 10/12/2019) 10 Tab 0     No current Epic-ordered facility-administered medications on file.        History reviewed. No pertinent past medical history.    Social History     Tobacco Use   • Smoking status: Never Smoker   • Smokeless tobacco: Never Used   Substance Use Topics   • Alcohol use: No   • Drug use: No       Family Status   Relation Name Status   • Mo  Alive   • Fa  Alive   • Sis  Alive   • MGMo  Alive   • MGFa     • PGMo     • PGFa       Family History   Problem Relation  "Age of Onset   • No Known Problems Mother    • No Known Problems Father    • No Known Problems Sister        ROS:  Review of Systems   Constitutional: Negative for fever, chills, weight loss, malaise, and fatigue.   HENT: Negative for ear pain, nosebleeds, congestion, sore throat and neck pain.    Eyes: Negative for vision changes.   Neuro: Negative for headache, sensory changes, weakness, seizure, LOC.   Cardiovascular: Negative for chest pain, palpitations, orthopnea and leg swelling.   Respiratory: Negative for cough, sputum production, shortness of breath and wheezing.   Gastrointestinal: Negative for abdominal pain, nausea, vomiting or diarrhea.   Genitourinary: Negative for dysuria, urgency and frequency.  Musculoskeletal: Negative for falls, neck pain, back pain, joint pain, myalgias.   Skin: Positive for rash.  Negative for diaphoresis.     Exam:  /84 (BP Location: Left arm, Patient Position: Sitting, BP Cuff Size: Adult)   Pulse (!) 105   Temp 36.2 °C (97.2 °F) (Temporal)   Resp 18   Ht 1.549 m (5' 1\")   Wt 81.6 kg (180 lb)   SpO2 100%   General: well-nourished, well-developed female in NAD  Head: normocephalic, atraumatic  Eyes: PERRLA, no conjunctival injection, acuity grossly intact, lids normal.  Ears: normal shape and symmetry, no tenderness, no discharge. External canals are without any significant edema or erythema. Tympanic membranes are without any inflammation, no effusion. Gross auditory acuity is intact.  Nose: symmetrical without tenderness, no discharge.  Mouth/Throat: reasonable hygiene, no erythema, exudates or tonsillar enlargement.  Neck: no masses, range of motion within normal limits, no tracheal deviation. No obvious thyroid enlargement.   Lymph: no cervical adenopathy. No supraclavicular adenopathy.   Neuro: alert and oriented. Cranial nerves 1-12 grossly intact. No sensory deficit.   Cardiovascular: regular rate and rhythm. No edema.  Pulmonary: no distress. Chest is " symmetrical with respiration, no wheezes, crackles, or rhonchi.   Musculoskeletal: no clubbing, appropriate muscle tone, gait is stable.  Skin: warm, no clubbing, no cyanosis. There is a confluent area of excoriation with some white coating to left groin, consistent with candida. Furthermore, there is a small vesicular rash to left upper lip, no erythema or drainage, consistent with herpetic lesions.   Psych: appropriate mood, affect, judgement.         Assessment/Plan:  1. Recurrent oral herpes simplex  acyclovir (ZOVIRAX) 400 MG tablet   2. Candida rash of groin  nystatin (MYCOSTATIN) 093695 UNIT/GM Cream topical cream       Rash to face is consistent with oral herpes.  Acyclovir as directed.  Home care discussed.  Rash to groin is consistent with Candida etiology, nystatin as directed, keep area clean and dry.  Supportive care, differential diagnoses, and indications for immediate follow-up discussed with patient.   Pathogenesis of diagnosis discussed including typical length and natural progression.   Instructed to return to clinic or nearest emergency department for any change in condition, further concerns, or worsening of symptoms.  Patient states understanding of the plan of care and discharge instructions.  Instructed to make an appointment, for follow up, with her primary care provider.        Please note that this dictation was created using voice recognition software. I have made every reasonable attempt to correct obvious errors, but I expect that there are errors of grammar and possibly content that I did not discover before finalizing the note.      YASMINE Traore.

## 2020-01-05 NOTE — PROGRESS NOTES
Pt here today for OB follow up  Pt states has yeast infection and has 7 day monostat  Reports +FM  Good # [phone]  Pharmacy Confirmed.     vertigo/syncope

## 2020-07-12 ENCOUNTER — NON-PROVIDER VISIT (OUTPATIENT)
Dept: URGENT CARE | Facility: PHYSICIAN GROUP | Age: 25
End: 2020-07-12

## 2020-07-12 DIAGNOSIS — Z02.1 PRE-EMPLOYMENT DRUG SCREENING: ICD-10-CM

## 2020-07-12 LAB
AMP AMPHETAMINE: NORMAL
COC COCAINE: NORMAL
INT CON NEG: NORMAL
INT CON POS: NORMAL
MET METHAMPHETAMINES: NORMAL
OPI OPIATES: NORMAL
PCP PHENCYCLIDINE: NORMAL
POC DRUG COMMENT 753798-OCCUPATIONAL HEALTH: NORMAL
THC: NORMAL

## 2020-07-12 PROCEDURE — 80305 DRUG TEST PRSMV DIR OPT OBS: CPT | Performed by: PHYSICIAN ASSISTANT

## 2020-11-18 ENCOUNTER — OFFICE VISIT (OUTPATIENT)
Dept: URGENT CARE | Facility: PHYSICIAN GROUP | Age: 25
End: 2020-11-18
Payer: COMMERCIAL

## 2020-11-18 VITALS
OXYGEN SATURATION: 100 % | WEIGHT: 198.4 LBS | BODY MASS INDEX: 37.46 KG/M2 | TEMPERATURE: 97.1 F | HEART RATE: 99 BPM | RESPIRATION RATE: 16 BRPM | DIASTOLIC BLOOD PRESSURE: 82 MMHG | SYSTOLIC BLOOD PRESSURE: 144 MMHG | HEIGHT: 61 IN

## 2020-11-18 DIAGNOSIS — L30.9 DERMATITIS: ICD-10-CM

## 2020-11-18 PROCEDURE — 99214 OFFICE O/P EST MOD 30 MIN: CPT | Performed by: PHYSICIAN ASSISTANT

## 2020-11-18 ASSESSMENT — FIBROSIS 4 INDEX: FIB4 SCORE: 0.29

## 2020-11-19 RX ORDER — TRIAMCINOLONE ACETONIDE 1 MG/G
1 OINTMENT TOPICAL 2 TIMES DAILY
Qty: 80 G | Refills: 0 | Status: SHIPPED | OUTPATIENT
Start: 2020-11-19

## 2020-11-22 ASSESSMENT — ENCOUNTER SYMPTOMS
RHINORRHEA: 0
FEVER: 0
FATIGUE: 0

## 2020-11-22 NOTE — PROGRESS NOTES
Subjective:   Venus Jimenez is a 25 y.o. female who presents for Rash (rash on both hands, dry, itchiness, pt believes they had allergic reaction to gloves, x1 week )        Rash  This is a new problem. The current episode started more than 1 month ago (present off and on for months, but worsened in last week). The problem has been gradually worsening since onset. The affected locations include the left hand and right hand. The rash is characterized by itchiness, dryness and redness. Associated with: disposable work gloves. Pertinent negatives include no congestion, fatigue, fever, joint pain or rhinorrhea. Past treatments include antibiotic cream. The treatment provided no relief. There is no history of eczema.     Review of Systems   Constitutional: Negative for fatigue and fever.   HENT: Negative for congestion and rhinorrhea.    Musculoskeletal: Negative for joint pain.   Skin: Positive for rash.       PMH:  has no past medical history of Blood transfusion without reported diagnosis.  MEDS:   Current Outpatient Medications:   •  triamcinolone acetonide (KENALOG) 0.1 % Ointment, Apply 1 Units topically 2 times a day., Disp: 80 g, Rfl: 0  •  nystatin (MYCOSTATIN) 970603 UNIT/GM Cream topical cream, Apply to area twice per day., Disp: 1 Tube, Rfl: 1  •  acyclovir (ZOVIRAX) 400 MG tablet, Take 1 Tab by mouth 3 times a day. Take three times per day until symptoms healed, up to 10 days., Disp: 30 Tab, Rfl: 1  •  ondansetron (ZOFRAN ODT) 4 MG TABLET DISPERSIBLE, Take 1 Tab by mouth every 6 hours as needed for Nausea. (Patient not taking: Reported on 10/12/2019), Disp: 10 Tab, Rfl: 0  ALLERGIES:   Allergies   Allergen Reactions   • Penicillins Unspecified     Pt reports having reaction to PCN in childhood     SURGHX: History reviewed. No pertinent surgical history.  SOCHX:  reports that she has never smoked. She has never used smokeless tobacco. She reports that she does not drink alcohol or use drugs.  FH: Family  "history was reviewed, no pertinent findings to report   Objective:   /82 (BP Location: Right arm, Patient Position: Sitting, BP Cuff Size: Adult)   Pulse 99   Temp 36.2 °C (97.1 °F) (Temporal)   Resp 16   Ht 1.549 m (5' 1\")   Wt 90 kg (198 lb 6.4 oz)   SpO2 100%   BMI 37.49 kg/m²   Physical Exam  Vitals signs reviewed.   Constitutional:       General: She is not in acute distress.     Appearance: Normal appearance. She is well-developed. She is not toxic-appearing.   HENT:      Head: Normocephalic and atraumatic.      Right Ear: External ear normal.      Left Ear: External ear normal.      Nose: Nose normal.   Eyes:      General: Gaze aligned appropriately.   Neck:      Musculoskeletal: Neck supple.   Cardiovascular:      Rate and Rhythm: Normal rate and regular rhythm.   Pulmonary:      Effort: Pulmonary effort is normal. No respiratory distress.      Breath sounds: No stridor.   Skin:     General: Skin is warm and dry.      Capillary Refill: Capillary refill takes less than 2 seconds.      Comments: Well demarcated, erythematous, blanching plaques with overlying dry skin on dorsal aspects of hands, bilaterally. No discharge.   Neurological:      Mental Status: She is alert and oriented to person, place, and time.      Comments: CN2-12 grossly intact   Psychiatric:         Speech: Speech normal.         Behavior: Behavior normal.           Assessment/Plan:   1. Dermatitis  - triamcinolone acetonide (KENALOG) 0.1 % Ointment; Apply 1 Units topically 2 times a day.  Dispense: 80 g; Refill: 0    Appearance of lesions suggestive of psoriasis, however pt has no prior history. Will start patient on emollients and topical CS. Try different type of glove. May also try zyrtec. If sx persist pt instructed to f/u with primary and discuss derm referral.    Differential diagnosis, natural history, supportive care, and indications for immediate follow-up discussed.  "

## 2023-08-09 NOTE — CARE PLAN
Problem: Altered physiologic condition related to immediate post-delivery state and potential for bleeding/hemorrhage  Goal: Patient physiologically stable as evidenced by normal lochia, palpable uterine involution and vital signs within normal limits  Outcome: PROGRESSING AS EXPECTED  Fundus firm @ U, lochia rubra minimal. VSS    Problem: Alteration in comfort related to episiotomy, vaginal repair and/or after birth pains  Goal: Patient is able to ambulate, care for self and infant  Outcome: PROGRESSING AS EXPECTED  Patient is ambulating well and able to care for self and infant.        normal for race

## 2023-11-28 ENCOUNTER — APPOINTMENT (RX ONLY)
Dept: URBAN - METROPOLITAN AREA CLINIC 4 | Facility: CLINIC | Age: 28
Setting detail: DERMATOLOGY
End: 2023-11-28

## 2023-11-28 DIAGNOSIS — L60.8 OTHER NAIL DISORDERS: ICD-10-CM

## 2023-11-28 PROCEDURE — ? COUNSELING

## 2023-11-28 PROCEDURE — 99202 OFFICE O/P NEW SF 15 MIN: CPT

## 2023-11-28 ASSESSMENT — LOCATION ZONE DERM
LOCATION ZONE: TOENAIL
LOCATION ZONE: TOE

## 2023-11-28 ASSESSMENT — LOCATION DETAILED DESCRIPTION DERM
LOCATION DETAILED: LEFT 5TH TOE
LOCATION DETAILED: LEFT 5TH TOENAIL

## 2023-11-28 ASSESSMENT — LOCATION SIMPLE DESCRIPTION DERM
LOCATION SIMPLE: LEFT 5TH TOE
LOCATION SIMPLE: LEFT 5TH TOE

## 2024-01-06 NOTE — PROGRESS NOTES
Second gel placement done now. Cervix 1/50/-2. Vertex. Category one strip noted.    b/l LE grossly 4/5